# Patient Record
Sex: MALE | Race: WHITE | NOT HISPANIC OR LATINO | Employment: FULL TIME | URBAN - METROPOLITAN AREA
[De-identification: names, ages, dates, MRNs, and addresses within clinical notes are randomized per-mention and may not be internally consistent; named-entity substitution may affect disease eponyms.]

---

## 2017-01-18 ENCOUNTER — OFFICE VISIT (OUTPATIENT)
Dept: GASTROENTEROLOGY | Facility: CLINIC | Age: 57
End: 2017-01-18

## 2017-01-18 VITALS
WEIGHT: 254.6 LBS | SYSTOLIC BLOOD PRESSURE: 132 MMHG | HEIGHT: 71 IN | BODY MASS INDEX: 35.64 KG/M2 | DIASTOLIC BLOOD PRESSURE: 82 MMHG

## 2017-01-18 DIAGNOSIS — K63.5 COLON POLYPS: ICD-10-CM

## 2017-01-18 DIAGNOSIS — K92.1 MELENA: Primary | ICD-10-CM

## 2017-01-18 LAB
ERYTHROCYTE [DISTWIDTH] IN BLOOD BY AUTOMATED COUNT: 14.4 % (ref 11.5–14.5)
HCT VFR BLD AUTO: 32.9 % (ref 40.4–52.2)
HGB BLD-MCNC: 11.1 G/DL (ref 13.7–17.6)
MCH RBC QN AUTO: 28.2 PG (ref 27–32.7)
MCHC RBC AUTO-ENTMCNC: 33.7 G/DL (ref 32.6–36.4)
MCV RBC AUTO: 83.7 FL (ref 79.8–96.2)
PLATELET # BLD AUTO: 282 10*3/MM3 (ref 140–500)
RBC # BLD AUTO: 3.93 10*6/MM3 (ref 4.6–6)
WBC # BLD AUTO: 8.67 10*3/MM3 (ref 4.5–10.7)

## 2017-01-18 PROCEDURE — 99202 OFFICE O/P NEW SF 15 MIN: CPT | Performed by: INTERNAL MEDICINE

## 2017-01-18 RX ORDER — SODIUM CHLORIDE 0.9 % (FLUSH) 0.9 %
1-10 SYRINGE (ML) INJECTION AS NEEDED
Status: CANCELLED | OUTPATIENT
Start: 2017-01-18

## 2017-01-18 RX ORDER — IBUPROFEN 200 MG
200 TABLET ORAL EVERY 6 HOURS PRN
COMMUNITY

## 2017-01-18 RX ORDER — MELATONIN
1000 DAILY
COMMUNITY

## 2017-01-18 RX ORDER — AMLODIPINE AND VALSARTAN 5; 320 MG/1; MG/1
TABLET ORAL
COMMUNITY
Start: 2012-11-28 | End: 2020-01-31

## 2017-01-18 RX ORDER — SODIUM CHLORIDE, SODIUM LACTATE, POTASSIUM CHLORIDE, CALCIUM CHLORIDE 600; 310; 30; 20 MG/100ML; MG/100ML; MG/100ML; MG/100ML
30 INJECTION, SOLUTION INTRAVENOUS CONTINUOUS
Status: CANCELLED | OUTPATIENT
Start: 2017-01-18

## 2017-01-18 NOTE — MR AVS SNAPSHOT
Junior Syed   2017 9:00 AM   Office Visit    Dept Phone:  [phone]   Encounter #:  [id number]    Provider:  Jed STUART MD   Department:  [facility]                Your Full Care Plan              Your Updated Medication List          This list is accurate as of: 17 10:12 AM.  Always use your most recent med list.                amLODIPine-valsartan 5-320 MG per tablet   Commonly known as:  EXFORGE       cholecalciferol 1000 UNITS tablet   Commonly known as:  VITAMIN D3       ibuprofen 200 MG tablet   Commonly known as:  ADVIL,MOTRIN       PEPCID COMPLETE -165 MG chewable tablet   Generic drug:  famotidine-calcium carb-mag hydroxide               You Were Diagnosed With        Codes Comments    Melena    -  Primary ICD-10-CM: K92.1  ICD-9-CM: 578.1       Instructions     None    Patient Instructions History      Upcoming Appointments     Visit Type Date Time Department    NEW PATIENT 2017  9:00 AM [facility] [place]      [employer] Signup     [facility] [employer] allows you to send messages to your doctor, view your test results, renew your prescriptions, schedule appointments, and more. To sign up, go to [employer] and click on the Sign Up Now link in the New User? box. Enter your [employer] Activation Code exactly as it appears below along with the last four digits of your Social Security Number and your Date of Birth () to complete the sign-up process. If you do not sign up before the expiration date, you must request a new code.    [employer] Activation Code: [email or web address]  Expires: 2017 10:12 AM    If you have questions, you can email [email or web address] or call [phone] to talk to our [employer] staff. Remember, [employer] is NOT to be used for urgent needs. For medical emergencies, dial 911.               Other Info from Your Visit           Allergies     No Known Allergies      Reason  "for Visit     Black or Bloody Stool     upset stomach           Vital Signs     Blood Pressure Height Weight Body Mass Index Smoking Status       132/82 71\" (180.3 cm) 254 lb 9.6 oz (115 kg) 35.51 kg/m2 Former Smoker       Problems and Diagnoses Noted     Black stool due to blood    -  Primary        "

## 2017-01-18 NOTE — PROGRESS NOTES
Chief Complaint   Patient presents with   • Black or Bloody Stool   • upset stomach        Junior Syed is a  56 y.o. male here for an initial visit for melena    HPI this 56-year-old white male patient of Lou Gold presents with reported melena over the past weekend.  He describes some periumbilical pain as well as black tarry consistency stools.  His stools have subsequently turned to a dark brown color.  He denies any prior history of melena.  He also denies any use of Pepto-Bismol, iron or other agents that could affect stool color.  He has had previous colonoscopic evaluation although those records are pending.  Per his account he has had polyps removed in the past, it is not clear as to when he is due for follow-up examination at this point.  Normal bowel function is one stool daily to every second or third day but no significant constipation reported.  Family history was negative for colorectal cancer.  He does admit to use of Advil anywhere from one to 4 times per week.  He complains of some recent stressors at home.    Past Medical History   Diagnosis Date   • GERD (gastroesophageal reflux disease)    • Hypertension        Current Outpatient Prescriptions   Medication Sig Dispense Refill   • amLODIPine-valsartan (EXFORGE) 5-320 MG per tablet Take  by mouth.     • cholecalciferol (VITAMIN D3) 1000 UNITS tablet Take 1,000 Units by mouth Daily.     • famotidine-calcium carb-mag hydroxide (PEPCID COMPLETE) -165 MG chewable tablet Chew.     • ibuprofen (ADVIL,MOTRIN) 200 MG tablet Take 200 mg by mouth Every 6 (Six) Hours As Needed for mild pain (1-3).       No current facility-administered medications for this visit.        PRN Meds:.    No Known Allergies    Social History     Social History   • Marital status:      Spouse name: N/A   • Number of children: N/A   • Years of education: N/A     Occupational History   • Not on file.     Social History Main Topics   • Smoking status: Former Smoker      Packs/day: 1.00     Types: Cigarettes     Quit date: 1982   • Smokeless tobacco: Not on file   • Alcohol use No   • Drug use: No   • Sexual activity: Not on file     Other Topics Concern   • Not on file     Social History Narrative   • No narrative on file       History reviewed. No pertinent family history.    Review of Systems   Constitutional: Negative for activity change, appetite change, fatigue and unexpected weight change.   HENT: Negative for congestion, facial swelling, sore throat, trouble swallowing and voice change.    Eyes: Negative for photophobia and visual disturbance.   Respiratory: Negative for cough and choking.    Cardiovascular: Negative for chest pain.   Gastrointestinal: Positive for abdominal pain and blood in stool. Negative for abdominal distention, anal bleeding, constipation, diarrhea, nausea, rectal pain and vomiting.   Endocrine: Negative for polyphagia.   Musculoskeletal: Negative for arthralgias, gait problem and joint swelling.   Skin: Negative for color change, pallor and rash.   Allergic/Immunologic: Negative for food allergies.   Neurological: Negative for speech difficulty and headaches.   Hematological: Does not bruise/bleed easily.   Psychiatric/Behavioral: Negative for agitation, confusion and sleep disturbance.       Vitals:    01/18/17 0924   BP: 132/82       Physical Exam   Constitutional: He is oriented to person, place, and time. He appears well-developed and well-nourished. No distress.   HENT:   Head: Normocephalic.   Mouth/Throat: Oropharynx is clear and moist. No oropharyngeal exudate.   Eyes: Conjunctivae and EOM are normal. No scleral icterus.   Neck: Normal range of motion. No thyromegaly present.   Cardiovascular: Normal rate and regular rhythm.    No murmur heard.  Pulmonary/Chest: Breath sounds normal. No respiratory distress. He has no wheezes. He has no rales.   Abdominal: Soft. Bowel sounds are normal. He exhibits no distension and no mass. There is no  hepatosplenomegaly. There is no tenderness.   Musculoskeletal: Normal range of motion. He exhibits no edema or tenderness.   Lymphadenopathy:     He has no cervical adenopathy.   Neurological: He is alert and oriented to person, place, and time.   Skin: Skin is warm and dry. No rash noted. He is not diaphoretic. No erythema.   Psychiatric: He has a normal mood and affect. His behavior is normal.   Vitals reviewed.      ASSESSMENT   #1 melena: Consistent with upper GI tract bleeding source, no prior history of peptic ulcer disease.  NSAID use reported.  #2 history of polyps: Old records pending        PLAN  Schedule EGD, may warrant colonoscopy as well once records reviewed  CBC  Trial of over-the-counter PPI      ICD-10-CM ICD-9-CM   1. Melena K92.1 578.1

## 2017-01-25 ENCOUNTER — TELEPHONE (OUTPATIENT)
Dept: GASTROENTEROLOGY | Facility: CLINIC | Age: 57
End: 2017-01-25

## 2017-01-25 DIAGNOSIS — Z12.12 SCREENING FOR COLORECTAL CANCER: Primary | ICD-10-CM

## 2017-01-25 DIAGNOSIS — K63.5 COLON POLYPS: ICD-10-CM

## 2017-01-25 DIAGNOSIS — Z12.11 SCREENING FOR COLORECTAL CANCER: Primary | ICD-10-CM

## 2017-01-25 RX ORDER — SODIUM CHLORIDE 0.9 % (FLUSH) 0.9 %
1-10 SYRINGE (ML) INJECTION AS NEEDED
Status: CANCELLED | OUTPATIENT
Start: 2017-01-25

## 2017-01-25 NOTE — TELEPHONE ENCOUNTER
Called pt and on vm advised per Dr Zhang that it had been since 2002 since his last c/s and he certainly qualifies for c/s for screening purposes.  Message sent to scheduling to add c/s.

## 2017-01-25 NOTE — TELEPHONE ENCOUNTER
Returned pt's call and advised per Dr Zhang that his hgb is low at 11.1 and rbc low at 3.93 and he recommends to proceed with egd as planned.       Pt verb understanding and is asking if Dr Zhang has reviewed his other records.  He states if he needs c/s he would like to have it done at the same time as egd.  Advised would send message to Dr Zhang.  Pt verb understanding.

## 2017-01-25 NOTE — TELEPHONE ENCOUNTER
----- Message from Jed STUART MD sent at 1/19/2017  6:05 PM EST -----  Regarding: Results  Would notify patient of lab results, proceed with EGD as planned  ----- Message -----     From: [facility] Results In     Sent: 1/18/2017   4:18 PM       To: Jed STUART MD

## 2017-01-25 NOTE — TELEPHONE ENCOUNTER
According to old record review, last colonoscopy was in 2002.  He was certainly qualify for colonoscopy at this time for screening purposes alone.  Can add this to his EGD if patient wishes.  We will send in case request.

## 2017-01-30 ENCOUNTER — OUTSIDE FACILITY SERVICE (OUTPATIENT)
Dept: GASTROENTEROLOGY | Facility: CLINIC | Age: 57
End: 2017-01-30

## 2017-02-03 ENCOUNTER — OFFICE VISIT (OUTPATIENT)
Dept: RETAIL CLINIC | Facility: CLINIC | Age: 57
End: 2017-02-03

## 2017-02-03 VITALS
RESPIRATION RATE: 22 BRPM | OXYGEN SATURATION: 97 % | TEMPERATURE: 100.7 F | DIASTOLIC BLOOD PRESSURE: 78 MMHG | SYSTOLIC BLOOD PRESSURE: 110 MMHG | HEART RATE: 91 BPM

## 2017-02-03 DIAGNOSIS — J02.9 PHARYNGITIS, UNSPECIFIED ETIOLOGY: ICD-10-CM

## 2017-02-03 DIAGNOSIS — J06.9 ACUTE URI: Primary | ICD-10-CM

## 2017-02-03 PROCEDURE — 99213 OFFICE O/P EST LOW 20 MIN: CPT | Performed by: NURSE PRACTITIONER

## 2017-02-03 RX ORDER — FLUTICASONE PROPIONATE 50 MCG
2 SPRAY, SUSPENSION (ML) NASAL DAILY
Qty: 1 EACH | Refills: 0 | Status: SHIPPED | OUTPATIENT
Start: 2017-02-03 | End: 2017-02-07

## 2017-02-03 RX ORDER — CETIRIZINE HYDROCHLORIDE 10 MG/1
10 TABLET ORAL DAILY
Qty: 14 TABLET | Refills: 0 | Status: SHIPPED | OUTPATIENT
Start: 2017-02-03 | End: 2017-02-17

## 2017-02-03 NOTE — PATIENT INSTRUCTIONS
"Upper Respiratory Infection, Adult  Most upper respiratory infections (URIs) are a viral infection of the air passages leading to the lungs. A URI affects the nose, throat, and upper air passages. The most common type of URI is nasopharyngitis and is typically referred to as \"the common cold.\"  URIs run their course and usually go away on their own. Most of the time, a URI does not require medical attention, but sometimes a bacterial infection in the upper airways can follow a viral infection. This is called a secondary infection. Sinus and middle ear infections are common types of secondary upper respiratory infections.  Bacterial pneumonia can also complicate a URI. A URI can worsen asthma and chronic obstructive pulmonary disease (COPD). Sometimes, these complications can require emergency medical care and may be life threatening.   CAUSES  Almost all URIs are caused by viruses. A virus is a type of germ and can spread from one person to another.   RISKS FACTORS  You may be at risk for a URI if:   · You smoke.    · You have chronic heart or lung disease.  · You have a weakened defense (immune) system.    · You are very young or very old.    · You have nasal allergies or asthma.  · You work in crowded or poorly ventilated areas.  · You work in health care facilities or schools.  SIGNS AND SYMPTOMS   Symptoms typically develop 2-3 days after you come in contact with a cold virus. Most viral URIs last 7-10 days. However, viral URIs from the influenza virus (flu virus) can last 14-18 days and are typically more severe. Symptoms may include:   · Runny or stuffy (congested) nose.    · Sneezing.    · Cough.    · Sore throat.    · Headache.    · Fatigue.    · Fever.    · Loss of appetite.    · Pain in your forehead, behind your eyes, and over your cheekbones (sinus pain).  · Muscle aches.    DIAGNOSIS   Your health care provider may diagnose a URI by:  · Physical exam.  · Tests to check that your symptoms are not due to " another condition such as:  ¨ Strep throat.  ¨ Sinusitis.  ¨ Pneumonia.  ¨ Asthma.  TREATMENT   A URI goes away on its own with time. It cannot be cured with medicines, but medicines may be prescribed or recommended to relieve symptoms. Medicines may help:  · Reduce your fever.  · Reduce your cough.  · Relieve nasal congestion.  HOME CARE INSTRUCTIONS   · Take medicines only as directed by your health care provider.    · Gargle warm saltwater or take cough drops to comfort your throat as directed by your health care provider.  · Use a warm mist humidifier or inhale steam from a shower to increase air moisture. This may make it easier to breathe.  · Drink enough fluid to keep your urine clear or pale yellow.    · Eat soups and other clear broths and maintain good nutrition.    · Rest as needed.    · Return to work when your temperature has returned to normal or as your health care provider advises. You may need to stay home longer to avoid infecting others. You can also use a face mask and careful hand washing to prevent spread of the virus.  · Increase the usage of your inhaler if you have asthma.    · Do not use any tobacco products, including cigarettes, chewing tobacco, or electronic cigarettes. If you need help quitting, ask your health care provider.  PREVENTION   The best way to protect yourself from getting a cold is to practice good hygiene.   · Avoid oral or hand contact with people with cold symptoms.    · Wash your hands often if contact occurs.    There is no clear evidence that vitamin C, vitamin E, echinacea, or exercise reduces the chance of developing a cold. However, it is always recommended to get plenty of rest, exercise, and practice good nutrition.   SEEK MEDICAL CARE IF:   · You are getting worse rather than better.    · Your symptoms are not controlled by medicine.    · You have chills.  · You have worsening shortness of breath.  · You have brown or red mucus.  · You have yellow or brown nasal  discharge.  · You have pain in your face, especially when you bend forward.  · You have a fever.  · You have swollen neck glands.  · You have pain while swallowing.  · You have white areas in the back of your throat.  SEEK IMMEDIATE MEDICAL CARE IF:   · You have severe or persistent:    Headache.    Ear pain.    Sinus pain.    Chest pain.  · You have chronic lung disease and any of the following:    Wheezing.    Prolonged cough.    Coughing up blood.    A change in your usual mucus.  · You have a stiff neck.  · You have changes in your:    Vision.    Hearing.    Thinking.    Mood.  MAKE SURE YOU:   · Understand these instructions.  · Will watch your condition.  · Will get help right away if you are not doing well or get worse.     This information is not intended to replace advice given to you by your health care provider. Make sure you discuss any questions you have with your health care provider.     Document Released: 06/13/2002 Document Revised: 05/03/2016 Document Reviewed: 03/25/2015  [employer] Interactive Patient Education ©2016 [employer] Inc[employer]  Pharyngitis  Pharyngitis is redness, pain, and swelling (inflammation) of your pharynx.   CAUSES   Pharyngitis is usually caused by infection. Most of the time, these infections are from viruses (viral) and are part of a cold. However, sometimes pharyngitis is caused by bacteria (bacterial). Pharyngitis can also be caused by allergies. Viral pharyngitis may be spread from person to person by coughing, sneezing, and personal items or utensils (cups, forks, spoons, toothbrushes). Bacterial pharyngitis may be spread from person to person by more intimate contact, such as kissing.   SIGNS AND SYMPTOMS   Symptoms of pharyngitis include:    · Sore throat.    · Tiredness (fatigue).    · Low-grade fever.    · Headache.  · Joint pain and muscle aches.  · Skin rashes.  · Swollen lymph nodes.  · Plaque-like film on throat or tonsils (often seen with bacterial  pharyngitis).  DIAGNOSIS   Your health care provider will ask you questions about your illness and your symptoms. Your medical history, along with a physical exam, is often all that is needed to diagnose pharyngitis. Sometimes, a rapid strep test is done. Other lab tests may also be done, depending on the suspected cause.   TREATMENT   Viral pharyngitis will usually get better in 3-4 days without the use of medicine. Bacterial pharyngitis is treated with medicines that kill germs (antibiotics).   HOME CARE INSTRUCTIONS   · Drink enough water and fluids to keep your urine clear or pale yellow.    · Only take over-the-counter or prescription medicines as directed by your health care provider:      If you are prescribed antibiotics, make sure you finish them even if you start to feel better.      Do not take aspirin.    · Get lots of rest.    · Gargle with 8 oz of salt water (½ tsp of salt per 1 qt of water) as often as every 1-2 hours to soothe your throat.    · Throat lozenges (if you are not at risk for choking) or sprays may be used to soothe your throat.  SEEK MEDICAL CARE IF:   · You have large, tender lumps in your neck.  · You have a rash.  · You cough up green, yellow-brown, or bloody spit.  SEEK IMMEDIATE MEDICAL CARE IF:   · Your neck becomes stiff.  · You drool or are unable to swallow liquids.  · You vomit or are unable to keep medicines or liquids down.  · You have severe pain that does not go away with the use of recommended medicines.  · You have trouble breathing (not caused by a stuffy nose).  MAKE SURE YOU:   · Understand these instructions.  · Will watch your condition.  · Will get help right away if you are not doing well or get worse.     This information is not intended to replace advice given to you by your health care provider. Make sure you discuss any questions you have with your health care provider.     Document Released: 12/18/2006 Document Revised: 10/08/2014 Document Reviewed:  08/25/2014  Elsevier Interactive Patient Education ©2016 Elsevier Inc.

## 2017-02-03 NOTE — PROGRESS NOTES
Subjective   Patient ID: Junior Syed is a 56 y.o. male presents with   Chief Complaint   Patient presents with   • Sore Throat     since last pm   • Fever   • Nasal Congestion   • Generalized Body Aches       Sore Throat    This is a new problem. The current episode started yesterday. The maximum temperature recorded prior to his arrival was 100.4 - 100.9 F. The fever has been present for less than 1 day. The pain is mild. Associated symptoms include congestion, coughing (dry x 1 day) and headaches. Pertinent negatives include no diarrhea, ear discharge, ear pain, shortness of breath, trouble swallowing or vomiting. He has tried acetaminophen (cough drops, robitussin) for the symptoms. The treatment provided mild relief.       No Known Allergies    The following portions of the patient's history were reviewed and updated as appropriate: allergies, current medications, past family history, past medical history, past social history, past surgical history and problem list.      Review of Systems   Constitutional: Positive for chills, fatigue and fever. Negative for diaphoresis.   HENT: Positive for congestion, postnasal drip, rhinorrhea, sinus pressure, sneezing and sore throat. Negative for ear discharge, ear pain, hearing loss and trouble swallowing.    Respiratory: Positive for cough (dry x 1 day). Negative for chest tightness, shortness of breath and wheezing.    Gastrointestinal: Negative for diarrhea, nausea and vomiting.   Musculoskeletal: Positive for myalgias.   Allergic/Immunologic: Positive for environmental allergies.   Neurological: Positive for headaches.       Objective     Vitals:    02/03/17 0951   BP: 110/78   Pulse: 91   Resp: 22   Temp: (!) 100.7 °F (38.2 °C)   SpO2: 97%         Physical Exam   Constitutional: He is oriented to person, place, and time. He appears well-developed and well-nourished. He does not appear ill. No distress.   HENT:   Head: Normocephalic.   Right Ear: Hearing, external  ear and ear canal normal.   Left Ear: Hearing, external ear and ear canal normal.   Nose: Sinus tenderness present. No rhinorrhea. Right sinus exhibits frontal sinus tenderness. Left sinus exhibits frontal sinus tenderness.   Mouth/Throat: Mucous membranes are normal. Posterior oropharyngeal erythema (mild) present. Tonsils are 2+ on the right. Tonsils are 2+ on the left. No tonsillar exudate.   Bilateral TMs not visualized due to cerumen impaction.     Eyes: Conjunctivae are normal.   Sclera white.   Neck: No tracheal deviation present.   Cardiovascular: Normal rate, regular rhythm, S1 normal, S2 normal and normal heart sounds.    Pulmonary/Chest: Effort normal and breath sounds normal. No accessory muscle usage. No respiratory distress.   Abdominal: Soft. Bowel sounds are normal. There is no tenderness.   Lymphadenopathy:     He has no cervical adenopathy.   Neurological: He is alert and oriented to person, place, and time.   Skin: Skin is warm and dry.   Vitals reviewed.         Junior was seen today for sore throat, fever, nasal congestion and generalized body aches.    Diagnoses and all orders for this visit:    Acute URI  -     fluticasone (FLONASE) 50 MCG/ACT nasal spray; 2 sprays into each nostril Daily for 4 days.  -     cetirizine (zyrTEC) 10 MG tablet; Take 1 tablet by mouth Daily for 14 days.    Pharyngitis, unspecified etiology        Follow-up with Primary Care Physician in 48-72 hours if these symptoms worsen or fail to improve as anticipated. Patient verbalizes understanding.

## 2017-02-07 ENCOUNTER — TELEPHONE (OUTPATIENT)
Dept: GASTROENTEROLOGY | Facility: CLINIC | Age: 57
End: 2017-02-07

## 2017-02-07 NOTE — TELEPHONE ENCOUNTER
----- Message from Jed STUART MD sent at 2/6/2017  5:53 PM EST -----  Regarding: RE: Path / Procedure  Okay to call results, recommend treatment with Pylera or Prevpac and subsequently with proton pump inhibitor once daily.  ----- Message -----     From: Carlos Cohn     Sent: 2/6/2017   2:04 PM       To: Jed STUART MD  Subject: Path / Procedure                                 Scanned in

## 2017-02-07 NOTE — TELEPHONE ENCOUNTER
With prior history of polyps and current history of hyperplastic polyp would advise follow-up colonoscopy in 5 years

## 2017-02-07 NOTE — TELEPHONE ENCOUNTER
Pt called and advsied per Dr Zhang that his duodenum bx showed active peptic type duodenitis with h plyori.  The stomach bx showed chronic active gastritis h pylori.  The ge junction bx showed mild active inflammation with no lucero's.  Also advised pt had one benign colon polyp     Confirmed with pt that he has no allergies to any medications.  Advised we would call in pylera pack for 14days and advised the pt we would call in omeprazole 20mg po bid for 14days .  Advised the pt when he completes the meds to let us know and we will call in a ppi daily for him to take.  Pt verb understanding.     Message sent to Dr Zhang for when to have repeat c/s.

## 2017-02-08 ENCOUNTER — TELEPHONE (OUTPATIENT)
Dept: GASTROENTEROLOGY | Facility: CLINIC | Age: 57
End: 2017-02-08

## 2017-02-08 NOTE — TELEPHONE ENCOUNTER
----- Message from Aashish Zepeda sent at 2/7/2017  4:13 PM EST -----  Regarding: CALL BACK   Contact: [phone]  PT CALLED BACK ABOUT MEDICATION

## 2017-02-08 NOTE — TELEPHONE ENCOUNTER
Called pt and pt reports his insurance will not cover for the pylera pack.  Advised would call his pharmacy and call him back.   Called pt's [facility] pharmacy and spoke with Luis and called in biaxin 500mg  One po bid for 14 days and amoxicillin 500mg take 2 tabs po bid for 14 days.  Pharmacist advised pt's insurance will not cover for bid ppi. Called pt and on vm  advised of the above and advised pt to purchase omeprazole 20mg otc and take it twice a day for 14 days.  Advised him to call with any questions.

## 2017-02-08 NOTE — TELEPHONE ENCOUNTER
Called pt and advised per  Dr Zhang with prior hx of polyps and current hx of hyperplastic polyp would advised repeat c/s in 5 yrs.  Pt verb understanding.     Message sent to Lexi to place c/s in recall for 01/31/2022.

## 2017-02-16 ENCOUNTER — DOCUMENTATION (OUTPATIENT)
Dept: GASTROENTEROLOGY | Facility: CLINIC | Age: 57
End: 2017-02-16

## 2017-02-16 NOTE — PROGRESS NOTES
Solis CASTILLO for Pylera-medication was changed to preferred med..   Note telephone encounter dated 2/8/17-I did confirm w/pharmacist Luis Jeffery today 2/16/2017 that new medication was picked up by patient.

## 2017-02-22 ENCOUNTER — TELEPHONE (OUTPATIENT)
Dept: GASTROENTEROLOGY | Facility: CLINIC | Age: 57
End: 2017-02-22

## 2017-02-22 NOTE — TELEPHONE ENCOUNTER
Once he has completed antibiotics for treatment of H. pylori, he should take his PPI daily for a period of 6-8 weeks.  Then he can stop the PPI (or switch to a histamine blocker if symptomatic when he stops ) and after 4 weeks undergo a urease breath test to confirm that H. pylori is eradicated

## 2017-02-22 NOTE — TELEPHONE ENCOUNTER
----- Message from Roma Guzman sent at 2/22/2017 11:06 AM EST -----  Regarding: PT CALLED - UPDATE  Contact: [phone]  PT WAS ADVISED TO CALL AND LET YOU ALL KNOW WHEN HE FINISHED 2WKS OF ANTIBIOTIC. HE IS FINISHED. THANKS

## 2017-02-24 NOTE — TELEPHONE ENCOUNTER
Call from pt.  Advise per Dr Zhang that once he has completed abx for treatment of H pylori, he should take his PPI daily for a period of 6-8 weeks.  Then he can stop the PPI, (or switch to a histamine blocker if symptomatic when he stops) and after 4 weeks undergo a urease breath test to confirmed that H pylori is eradicated.    This info repeated several times.  Advise pt that histamine blocker is Zantac or Tagamet.  Pt verb understanding.

## 2017-08-18 ENCOUNTER — OFFICE VISIT (OUTPATIENT)
Dept: RETAIL CLINIC | Facility: CLINIC | Age: 57
End: 2017-08-18

## 2017-08-18 VITALS
SYSTOLIC BLOOD PRESSURE: 110 MMHG | TEMPERATURE: 97.8 F | DIASTOLIC BLOOD PRESSURE: 78 MMHG | OXYGEN SATURATION: 97 % | RESPIRATION RATE: 16 BRPM | HEART RATE: 72 BPM

## 2017-08-18 DIAGNOSIS — J06.9 ACUTE URI: Primary | ICD-10-CM

## 2017-08-18 PROCEDURE — 99213 OFFICE O/P EST LOW 20 MIN: CPT | Performed by: NURSE PRACTITIONER

## 2017-08-18 RX ORDER — RANITIDINE 150 MG/1
150 TABLET ORAL NIGHTLY
COMMUNITY
End: 2020-01-31 | Stop reason: RX

## 2017-08-18 NOTE — PATIENT INSTRUCTIONS
"Upper Respiratory Infection, Adult  Most upper respiratory infections (URIs) are a viral infection of the air passages leading to the lungs. A URI affects the nose, throat, and upper air passages. The most common type of URI is nasopharyngitis and is typically referred to as \"the common cold.\"  URIs run their course and usually go away on their own. Most of the time, a URI does not require medical attention, but sometimes a bacterial infection in the upper airways can follow a viral infection. This is called a secondary infection. Sinus and middle ear infections are common types of secondary upper respiratory infections.  Bacterial pneumonia can also complicate a URI. A URI can worsen asthma and chronic obstructive pulmonary disease (COPD). Sometimes, these complications can require emergency medical care and may be life threatening.   CAUSES  Almost all URIs are caused by viruses. A virus is a type of germ and can spread from one person to another.   RISKS FACTORS  You may be at risk for a URI if:   · You smoke.    · You have chronic heart or lung disease.  · You have a weakened defense (immune) system.    · You are very young or very old.    · You have nasal allergies or asthma.  · You work in crowded or poorly ventilated areas.  · You work in health care facilities or schools.  SIGNS AND SYMPTOMS   Symptoms typically develop 2-3 days after you come in contact with a cold virus. Most viral URIs last 7-10 days. However, viral URIs from the influenza virus (flu virus) can last 14-18 days and are typically more severe. Symptoms may include:   · Runny or stuffy (congested) nose.    · Sneezing.    · Cough.    · Sore throat.    · Headache.    · Fatigue.    · Fever.    · Loss of appetite.    · Pain in your forehead, behind your eyes, and over your cheekbones (sinus pain).  · Muscle aches.    DIAGNOSIS   Your health care provider may diagnose a URI by:  · Physical exam.  · Tests to check that your symptoms are not due to " another condition such as:  ¨ Strep throat.  ¨ Sinusitis.  ¨ Pneumonia.  ¨ Asthma.  TREATMENT   A URI goes away on its own with time. It cannot be cured with medicines, but medicines may be prescribed or recommended to relieve symptoms. Medicines may help:  · Reduce your fever.  · Reduce your cough.  · Relieve nasal congestion.  HOME CARE INSTRUCTIONS   · Take medicines only as directed by your health care provider.    · Gargle warm saltwater or take cough drops to comfort your throat as directed by your health care provider.  · Use a warm mist humidifier or inhale steam from a shower to increase air moisture. This may make it easier to breathe.  · Drink enough fluid to keep your urine clear or pale yellow.    · Eat soups and other clear broths and maintain good nutrition.    · Rest as needed.    · Return to work when your temperature has returned to normal or as your health care provider advises. You may need to stay home longer to avoid infecting others. You can also use a face mask and careful hand washing to prevent spread of the virus.  · Increase the usage of your inhaler if you have asthma.    · Do not use any tobacco products, including cigarettes, chewing tobacco, or electronic cigarettes. If you need help quitting, ask your health care provider.  PREVENTION   The best way to protect yourself from getting a cold is to practice good hygiene.   · Avoid oral or hand contact with people with cold symptoms.    · Wash your hands often if contact occurs.    There is no clear evidence that vitamin C, vitamin E, echinacea, or exercise reduces the chance of developing a cold. However, it is always recommended to get plenty of rest, exercise, and practice good nutrition.   SEEK MEDICAL CARE IF:   · You are getting worse rather than better.    · Your symptoms are not controlled by medicine.    · You have chills.  · You have worsening shortness of breath.  · You have brown or red mucus.  · You have yellow or brown nasal  discharge.  · You have pain in your face, especially when you bend forward.  · You have a fever.  · You have swollen neck glands.  · You have pain while swallowing.  · You have white areas in the back of your throat.  SEEK IMMEDIATE MEDICAL CARE IF:   · You have severe or persistent:    Headache.    Ear pain.    Sinus pain.    Chest pain.  · You have chronic lung disease and any of the following:    Wheezing.    Prolonged cough.    Coughing up blood.    A change in your usual mucus.  · You have a stiff neck.  · You have changes in your:    Vision.    Hearing.    Thinking.    Mood.  MAKE SURE YOU:   · Understand these instructions.  · Will watch your condition.  · Will get help right away if you are not doing well or get worse.     This information is not intended to replace advice given to you by your health care provider. Make sure you discuss any questions you have with your health care provider.     Document Released: 06/13/2002 Document Revised: 05/03/2016 Document Reviewed: 03/25/2015  [employer] Interactive Patient Education ©2017 [employer]

## 2017-08-18 NOTE — PROGRESS NOTES
Subjective   Patient ID: Junior Syed is a 56 y.o. male presents with   Chief Complaint   Patient presents with   • URI     x2 days   • Cough   • Sore Throat     drainage       URI    This is a new problem. The current episode started in the past 7 days. The problem has been unchanged. Maximum temperature: low grade. Associated symptoms include congestion, coughing (dry), rhinorrhea, sneezing and a sore throat. Pertinent negatives include no wheezing. He has tried acetaminophen for the symptoms. The treatment provided mild relief.       No Known Allergies    The following portions of the patient's history were reviewed and updated as appropriate: allergies, current medications, past family history, past medical history, past social history, past surgical history and problem list.      Review of Systems   Constitutional: Positive for chills, diaphoresis, fatigue and fever (low grade).   HENT: Positive for congestion, postnasal drip, rhinorrhea, sinus pressure, sneezing and sore throat. Negative for trouble swallowing.    Respiratory: Positive for cough (dry). Negative for chest tightness, shortness of breath and wheezing.    Cardiovascular: Negative.    Gastrointestinal: Negative.    Allergic/Immunologic: Positive for environmental allergies.       Objective     Vitals:    08/18/17 0908   BP: 110/78   Pulse: 72   Resp: 16   Temp: 97.8 °F (36.6 °C)   SpO2: 97%         Physical Exam   Constitutional: He is oriented to person, place, and time. He appears well-developed and well-nourished. He does not appear ill. No distress.   HENT:   Head: Normocephalic.   Right Ear: Hearing, tympanic membrane, external ear and ear canal normal.   Left Ear: Hearing, tympanic membrane, external ear and ear canal normal.   Nose: Mucosal edema, rhinorrhea and sinus tenderness present. Right sinus exhibits maxillary sinus tenderness. Left sinus exhibits maxillary sinus tenderness.   Mouth/Throat: Oropharynx is clear and moist and mucous  membranes are normal. Tonsils are 2+ on the right. Tonsils are 2+ on the left. No tonsillar exudate.   Eyes: Conjunctivae are normal.   Sclera white.   Neck: No tracheal deviation present.   Cardiovascular: Normal rate, regular rhythm, S1 normal, S2 normal and normal heart sounds.    Pulmonary/Chest: Effort normal and breath sounds normal. No accessory muscle usage. No respiratory distress.   Abdominal: Soft. Bowel sounds are normal. There is no tenderness.   Lymphadenopathy:     He has no cervical adenopathy.   Neurological: He is alert and oriented to person, place, and time.   Skin: Skin is warm and dry.   Vitals reviewed.        Junior was seen today for uri, cough and sore throat.    Diagnoses and all orders for this visit:    Acute URI        Patient will take Mucinex 1 tab po bid for 5 days. May take Coricidin HBP po as directed per bottle for 5 days. Drink at least 8 cups of water a day. Throat lozenges/sprays, warm/cold drinks, cough drops, warm salt gargle to soothe throat.  Follow-up with Primary Care Physician in 48-72 hours if these symptoms worsen or fail to improve as anticipated. Patient verbalizes understanding.

## 2019-04-14 ENCOUNTER — OFFICE VISIT (OUTPATIENT)
Dept: RETAIL CLINIC | Facility: CLINIC | Age: 59
End: 2019-04-14

## 2019-04-14 VITALS
DIASTOLIC BLOOD PRESSURE: 88 MMHG | TEMPERATURE: 98.7 F | RESPIRATION RATE: 18 BRPM | OXYGEN SATURATION: 97 % | HEART RATE: 69 BPM | SYSTOLIC BLOOD PRESSURE: 142 MMHG

## 2019-04-14 DIAGNOSIS — T78.3XXA ANGIOEDEMA OF LIPS, INITIAL ENCOUNTER: Primary | ICD-10-CM

## 2019-04-14 PROCEDURE — 96372 THER/PROPH/DIAG INJ SC/IM: CPT | Performed by: NURSE PRACTITIONER

## 2019-04-14 PROCEDURE — 99213 OFFICE O/P EST LOW 20 MIN: CPT | Performed by: NURSE PRACTITIONER

## 2019-04-14 RX ORDER — METHYLPREDNISOLONE ACETATE 80 MG/ML
60 INJECTION, SUSPENSION INTRA-ARTICULAR; INTRALESIONAL; INTRAMUSCULAR; SOFT TISSUE ONCE
Status: COMPLETED | OUTPATIENT
Start: 2019-04-14 | End: 2019-04-14

## 2019-04-14 RX ADMIN — METHYLPREDNISOLONE ACETATE 60 MG: 80 INJECTION, SUSPENSION INTRA-ARTICULAR; INTRALESIONAL; INTRAMUSCULAR; SOFT TISSUE at 12:56

## 2019-04-14 NOTE — PROGRESS NOTES
Subjective   Patient ID: Junior Syed is a 58 y.o. male presents with   Chief Complaint   Patient presents with   • Allergic Reaction       Allergic Reaction   This is a new problem. The current episode started yesterday. The problem has been gradually improving since onset. The problem is mild. The patient was exposed to shellfish. Time of exposure: 9:30pm ate shrimp, swelling of mouth started at about 12:30am. The exposure occurred at home. Pertinent negatives include no drooling. Swelling is present on the lips. Past treatments include diphenhydramine. The treatment provided moderate relief. His past medical history is significant for seasonal allergies. There is no history of asthma, atopic dermatitis, food allergies (possibly shellfish) or medication allergies.       Allergies   Allergen Reactions   • Shellfish-Derived Products Swelling       The following portions of the patient's history were reviewed and updated as appropriate: allergies, current medications, past family history, past medical history, past social history, past surgical history and problem list.      Review of Systems   Constitutional: Positive for fatigue. Negative for chills, diaphoresis and fever.   HENT: Positive for dental problem (4/2/19 removed 7 of the upper front  teeth) and facial swelling (mild lips). Negative for drooling.    Respiratory: Negative.    Cardiovascular: Negative.    Gastrointestinal: Negative.    Allergic/Immunologic: Negative for food allergies (possibly shellfish).       Objective     Vitals:    04/14/19 1250   BP: 142/88   Pulse: 69   Resp: 18   Temp: 98.7 °F (37.1 °C)   SpO2: 97%         Physical Exam   Constitutional: He is oriented to person, place, and time. He appears well-developed and well-nourished. He does not appear ill. No distress.   HENT:   Head: Normocephalic.   Right Ear: Hearing and external ear normal.   Left Ear: Hearing and external ear normal.   Mouth/Throat: Mucous membranes are normal.  Abnormal dentition (absent upper front teeth.). No posterior oropharyngeal edema.   Mild edema to upper lip.     Eyes: Conjunctivae are normal.   Sclera white.   Neck: No tracheal deviation present.   Cardiovascular: Normal rate, regular rhythm, S1 normal, S2 normal and normal heart sounds.   Pulmonary/Chest: Effort normal and breath sounds normal. No accessory muscle usage. No respiratory distress.   Abdominal: Soft. Bowel sounds are normal. There is no tenderness.   Lymphadenopathy:     He has no cervical adenopathy.   Neurological: He is alert and oriented to person, place, and time.   Skin: Skin is warm and dry.   Vitals reviewed.        Junior was seen today for allergic reaction.    Diagnoses and all orders for this visit:    Angioedema of lips, initial encounter  -     methylPREDNISolone acetate (DEPO-medrol) injection 60 mg        Patient understands possible side effects of all medications ordered. Follow-up with Primary Care Physician in 48-72 hours if these symptoms worsen or fail to improve as anticipated. Patient verbalizes understanding.    Angioedema  Angioedema is the sudden swelling of tissue in the body. Angioedema can affect any part of the body, but it most often affects the deeper parts of the skin, causing red, itchy patches (hives) to appear over the affected area. It often begins during the night and is found in the morning.  Depending on the cause, angioedema may happen:  · Only once.  · Several times. It may come back in unpredictable patterns.  · Repeatedly for several years. Over time, it may gradually stop coming back.    Angioedema can be life-threatening if it affects the air passages that you breathe through.  What are the causes?  This condition may be caused by:  · Foods, such as milk, eggs, shellfish, wheat, or nuts.  · Certain medicines, such as ACE inhibitors, antibiotics, nonsteroidal anti-inflammatory drugs, birth control pills, or dyes used in X-rays.  · Insect  stings.  · Infections.    Angioedema can be inherited, and episodes can be triggered by:  · Mild injury.  · Dental work.  · Surgery.  · Stress.  · Sudden changes in temperature.  · Exercise.    In some cases, the cause of this condition is not known.  What are the signs or symptoms?  Symptoms of this condition depend on where the swelling happens. Symptoms may include:  · Swollen skin.  · Red, itchy patches of skin (hives).  · Redness in the affected area.  · Pain in the affected area.  · Swollen lips or tongue.  · Wheezing.  · Breathing problems.    If your internal organs are involved, symptoms may also include:  · Nausea.  · Abdominal pain.  · Vomiting.  · Difficulty swallowing.  · Difficulty passing urine.    How is this diagnosed?  This condition may be diagnosed based on:  · An exam of the affected area.  · Your medical history.  · Whether anyone in your family has had this condition before.  · A review of any medicines you have been taking.  · Tests, including:  ? Allergy skin tests to see if the condition was caused by an allergic reaction.  ? Blood tests to see if the condition was caused by a gene.  ? Tests to check for underlying diseases that could cause the condition.    How is this treated?  Treatment for this condition depends on the cause. It may involve any of the following:  · If something triggered the condition, making changes to keep it from triggering the condition again.  · If the condition affects your breathing, having tubes placed in your airway to keep it open.  · Taking medicines to treat symptoms or prevent future episodes. These may include:  ? Antihistamines.  ? Epinephrine injections.  ? Steroids.    If your condition is severe, you may need to be treated at the hospital. Angioedema usually gets better in 24-48 hours.  Follow these instructions at home:  · Take over-the-counter and prescription medicines only as told by your health care provider.  · If you were given medicines for  emergency allergy treatment, always carry them with you.  · Wear a medical bracelet as told by your health care provider.  · If something triggers your condition, avoid the trigger, if possible.  · If your condition is inherited and you are thinking about having children, talk to your health care provider. It is important to discuss the risks of passing on the condition to your children.  Contact a health care provider if:  · You have repeated episodes of angioedema.  · Episodes of angioedema start to happen more often than they used to, even after you take steps to prevent them.  · You have episodes of angioedema that are more severe than they have been before, even after you take steps to prevent them.  · You are thinking about having children.  Get help right away if:  · You have severe swelling of your mouth, tongue, or lips.  · You have trouble breathing.  · You have trouble swallowing.  · You faint.  This information is not intended to replace advice given to you by your health care provider. Make sure you discuss any questions you have with your health care provider.  Document Released: 02/26/2003 Document Revised: 07/15/2017 Document Reviewed: 06/27/2017  [employer] Interactive Patient Education © 2019 [employer]

## 2019-04-14 NOTE — PATIENT INSTRUCTIONS
Angioedema  Angioedema is the sudden swelling of tissue in the body. Angioedema can affect any part of the body, but it most often affects the deeper parts of the skin, causing red, itchy patches (hives) to appear over the affected area. It often begins during the night and is found in the morning.  Depending on the cause, angioedema may happen:  · Only once.  · Several times. It may come back in unpredictable patterns.  · Repeatedly for several years. Over time, it may gradually stop coming back.    Angioedema can be life-threatening if it affects the air passages that you breathe through.  What are the causes?  This condition may be caused by:  · Foods, such as milk, eggs, shellfish, wheat, or nuts.  · Certain medicines, such as ACE inhibitors, antibiotics, nonsteroidal anti-inflammatory drugs, birth control pills, or dyes used in X-rays.  · Insect stings.  · Infections.    Angioedema can be inherited, and episodes can be triggered by:  · Mild injury.  · Dental work.  · Surgery.  · Stress.  · Sudden changes in temperature.  · Exercise.    In some cases, the cause of this condition is not known.  What are the signs or symptoms?  Symptoms of this condition depend on where the swelling happens. Symptoms may include:  · Swollen skin.  · Red, itchy patches of skin (hives).  · Redness in the affected area.  · Pain in the affected area.  · Swollen lips or tongue.  · Wheezing.  · Breathing problems.    If your internal organs are involved, symptoms may also include:  · Nausea.  · Abdominal pain.  · Vomiting.  · Difficulty swallowing.  · Difficulty passing urine.    How is this diagnosed?  This condition may be diagnosed based on:  · An exam of the affected area.  · Your medical history.  · Whether anyone in your family has had this condition before.  · A review of any medicines you have been taking.  · Tests, including:  ? Allergy skin tests to see if the condition was caused by an allergic reaction.  ? Blood tests to see  if the condition was caused by a gene.  ? Tests to check for underlying diseases that could cause the condition.    How is this treated?  Treatment for this condition depends on the cause. It may involve any of the following:  · If something triggered the condition, making changes to keep it from triggering the condition again.  · If the condition affects your breathing, having tubes placed in your airway to keep it open.  · Taking medicines to treat symptoms or prevent future episodes. These may include:  ? Antihistamines.  ? Epinephrine injections.  ? Steroids.    If your condition is severe, you may need to be treated at the hospital. Angioedema usually gets better in 24-48 hours.  Follow these instructions at home:  · Take over-the-counter and prescription medicines only as told by your health care provider.  · If you were given medicines for emergency allergy treatment, always carry them with you.  · Wear a medical bracelet as told by your health care provider.  · If something triggers your condition, avoid the trigger, if possible.  · If your condition is inherited and you are thinking about having children, talk to your health care provider. It is important to discuss the risks of passing on the condition to your children.  Contact a health care provider if:  · You have repeated episodes of angioedema.  · Episodes of angioedema start to happen more often than they used to, even after you take steps to prevent them.  · You have episodes of angioedema that are more severe than they have been before, even after you take steps to prevent them.  · You are thinking about having children.  Get help right away if:  · You have severe swelling of your mouth, tongue, or lips.  · You have trouble breathing.  · You have trouble swallowing.  · You faint.  This information is not intended to replace advice given to you by your health care provider. Make sure you discuss any questions you have with your health care  provider.  Document Released: 02/26/2003 Document Revised: 07/15/2017 Document Reviewed: 06/27/2017  [employer] Interactive Patient Education © 2019 [employer]

## 2019-10-23 ENCOUNTER — IMMUNIZATION (OUTPATIENT)
Dept: RETAIL CLINIC | Facility: CLINIC | Age: 59
End: 2019-10-23

## 2019-10-23 DIAGNOSIS — Z23 FLU VACCINE NEED: Primary | ICD-10-CM

## 2019-10-23 PROCEDURE — 90471 IMMUNIZATION ADMIN: CPT | Performed by: NURSE PRACTITIONER

## 2019-10-23 PROCEDURE — 90686 IIV4 VACC NO PRSV 0.5 ML IM: CPT | Performed by: NURSE PRACTITIONER

## 2020-01-31 ENCOUNTER — OFFICE VISIT (OUTPATIENT)
Dept: FAMILY MEDICINE CLINIC | Facility: CLINIC | Age: 60
End: 2020-01-31

## 2020-01-31 VITALS
OXYGEN SATURATION: 99 % | SYSTOLIC BLOOD PRESSURE: 138 MMHG | TEMPERATURE: 97.6 F | DIASTOLIC BLOOD PRESSURE: 72 MMHG | HEIGHT: 71 IN | HEART RATE: 60 BPM | WEIGHT: 203.1 LBS | BODY MASS INDEX: 28.43 KG/M2

## 2020-01-31 DIAGNOSIS — I10 ESSENTIAL HYPERTENSION: Primary | ICD-10-CM

## 2020-01-31 DIAGNOSIS — Z82.49 FAMILY HISTORY OF HEART DISEASE IN MALE FAMILY MEMBER BEFORE AGE 55: ICD-10-CM

## 2020-01-31 PROCEDURE — 99213 OFFICE O/P EST LOW 20 MIN: CPT | Performed by: NURSE PRACTITIONER

## 2020-02-02 ENCOUNTER — RESULTS ENCOUNTER (OUTPATIENT)
Dept: FAMILY MEDICINE CLINIC | Facility: CLINIC | Age: 60
End: 2020-02-02

## 2020-02-02 DIAGNOSIS — Z82.49 FAMILY HISTORY OF HEART DISEASE IN MALE FAMILY MEMBER BEFORE AGE 55: ICD-10-CM

## 2020-02-02 DIAGNOSIS — I10 ESSENTIAL HYPERTENSION: ICD-10-CM

## 2020-02-04 LAB
ALBUMIN SERPL-MCNC: 4.4 G/DL (ref 3.8–4.9)
ALBUMIN/GLOB SERPL: 1.5 {RATIO} (ref 1.2–2.2)
ALP SERPL-CCNC: 61 IU/L (ref 39–117)
ALT SERPL-CCNC: 13 IU/L (ref 0–44)
AST SERPL-CCNC: 20 IU/L (ref 0–40)
BILIRUB SERPL-MCNC: 0.5 MG/DL (ref 0–1.2)
BUN SERPL-MCNC: 13 MG/DL (ref 6–24)
BUN/CREAT SERPL: 11 (ref 9–20)
CALCIUM SERPL-MCNC: 9.4 MG/DL (ref 8.7–10.2)
CHLORIDE SERPL-SCNC: 104 MMOL/L (ref 96–106)
CHOLEST SERPL-MCNC: 178 MG/DL (ref 100–199)
CHOLEST/HDLC SERPL: 3.6 RATIO (ref 0–5)
CO2 SERPL-SCNC: 24 MMOL/L (ref 20–29)
CREAT SERPL-MCNC: 1.15 MG/DL (ref 0.76–1.27)
GLOBULIN SER CALC-MCNC: 3 G/DL (ref 1.5–4.5)
GLUCOSE SERPL-MCNC: 85 MG/DL (ref 65–99)
HDLC SERPL-MCNC: 50 MG/DL
LDLC SERPL CALC-MCNC: 116 MG/DL (ref 0–99)
POTASSIUM SERPL-SCNC: 4.2 MMOL/L (ref 3.5–5.2)
PROT SERPL-MCNC: 7.4 G/DL (ref 6–8.5)
SODIUM SERPL-SCNC: 143 MMOL/L (ref 134–144)
TRIGL SERPL-MCNC: 62 MG/DL (ref 0–149)
VLDLC SERPL CALC-MCNC: 12 MG/DL (ref 5–40)

## 2020-02-04 NOTE — PROGRESS NOTES
Please call him and let him know is labs are normal with the exception of his LDL cholesterol which is elevated. Exercise will help improve it.

## 2020-07-17 DIAGNOSIS — I10 ESSENTIAL HYPERTENSION: Primary | ICD-10-CM

## 2020-07-17 DIAGNOSIS — E78.2 MIXED HYPERLIPIDEMIA: ICD-10-CM

## 2020-07-17 DIAGNOSIS — N40.0 BENIGN PROSTATIC HYPERPLASIA WITHOUT LOWER URINARY TRACT SYMPTOMS: ICD-10-CM

## 2020-07-28 LAB
ALBUMIN SERPL-MCNC: 4 G/DL (ref 3.8–4.9)
ALBUMIN/GLOB SERPL: 1.4 {RATIO} (ref 1.2–2.2)
ALP SERPL-CCNC: 48 IU/L (ref 39–117)
ALT SERPL-CCNC: 11 IU/L (ref 0–44)
AST SERPL-CCNC: 16 IU/L (ref 0–40)
BILIRUB SERPL-MCNC: 0.4 MG/DL (ref 0–1.2)
BUN SERPL-MCNC: 12 MG/DL (ref 6–24)
BUN/CREAT SERPL: 10 (ref 9–20)
CALCIUM SERPL-MCNC: 9.1 MG/DL (ref 8.7–10.2)
CHLORIDE SERPL-SCNC: 106 MMOL/L (ref 96–106)
CHOLEST SERPL-MCNC: 174 MG/DL (ref 100–199)
CHOLEST/HDLC SERPL: 3.1 RATIO (ref 0–5)
CO2 SERPL-SCNC: 27 MMOL/L (ref 20–29)
CREAT SERPL-MCNC: 1.15 MG/DL (ref 0.76–1.27)
GLOBULIN SER CALC-MCNC: 2.8 G/DL (ref 1.5–4.5)
GLUCOSE SERPL-MCNC: 97 MG/DL (ref 65–99)
HDLC SERPL-MCNC: 57 MG/DL
LDLC SERPL CALC-MCNC: 103 MG/DL (ref 0–99)
POTASSIUM SERPL-SCNC: 4.1 MMOL/L (ref 3.5–5.2)
PROT SERPL-MCNC: 6.8 G/DL (ref 6–8.5)
PSA SERPL-MCNC: 5.4 NG/ML (ref 0–4)
SODIUM SERPL-SCNC: 143 MMOL/L (ref 134–144)
TRIGL SERPL-MCNC: 72 MG/DL (ref 0–149)
VLDLC SERPL CALC-MCNC: 14 MG/DL (ref 5–40)

## 2020-08-03 ENCOUNTER — OFFICE VISIT (OUTPATIENT)
Dept: FAMILY MEDICINE CLINIC | Facility: CLINIC | Age: 60
End: 2020-08-03

## 2020-08-03 VITALS
DIASTOLIC BLOOD PRESSURE: 88 MMHG | BODY MASS INDEX: 29.4 KG/M2 | HEIGHT: 71 IN | WEIGHT: 210 LBS | HEART RATE: 52 BPM | TEMPERATURE: 97.3 F | SYSTOLIC BLOOD PRESSURE: 134 MMHG | OXYGEN SATURATION: 98 %

## 2020-08-03 DIAGNOSIS — R97.20 ELEVATED PSA: ICD-10-CM

## 2020-08-03 DIAGNOSIS — L98.9 SKIN LESION OF SCALP: ICD-10-CM

## 2020-08-03 DIAGNOSIS — Z00.00 ANNUAL PHYSICAL EXAM: Primary | ICD-10-CM

## 2020-08-03 PROCEDURE — 99396 PREV VISIT EST AGE 40-64: CPT | Performed by: NURSE PRACTITIONER

## 2020-08-03 NOTE — PROGRESS NOTES
"Subjective   Junior Syed is a 59 y.o. male.     Chief Complaint   Patient presents with   • Annual Exam     Mr. Syed presents today for an annual physical exam with lab review. He states he is doing well. He is concerned about a \"lump\" on his scalp that has been there for awhile. He has no other concerns today.      I have reviewed the patient's medical history in detail and updated the computerized patient record.    The following portions of the patient's history were reviewed and updated as appropriate: allergies, current medications, past family history, past medical history, past social history, past surgical history and problem list.      Current Outpatient Medications:   •  cholecalciferol (VITAMIN D3) 1000 UNITS tablet, Take 1,000 Units by mouth Daily., Disp: , Rfl:   •  Fexofenadine HCl (ALLEGRA PO), Take 1 tablet by mouth Daily., Disp: , Rfl:   •  ibuprofen (ADVIL,MOTRIN) 200 MG tablet, Take 200 mg by mouth Every 6 (Six) Hours As Needed for mild pain (1-3)., Disp: , Rfl:      Review of Systems   Constitutional: Negative.    Eyes: Negative.    Respiratory: Negative.    Cardiovascular: Negative.    Gastrointestinal: Negative.    Genitourinary: Negative.    Musculoskeletal: Positive for arthralgias (left wrist ).   Skin:        \"Lump on top of his head\"   Neurological: Negative.    Psychiatric/Behavioral: Negative.    All other systems reviewed and are negative.      Objective    Vitals:    08/03/20 0934   BP: 134/88   Pulse: 52   Temp: 97.3 °F (36.3 °C)   SpO2: 98%     Body mass index is 29.29 kg/m².    Physical Exam   Constitutional: He is oriented to person, place, and time. He appears well-developed and well-nourished.   HENT:   Right Ear: Tympanic membrane normal.   Left Ear: Tympanic membrane normal.   Neck: Normal range of motion. Neck supple. No thyromegaly present.   Cardiovascular: Normal rate, regular rhythm, normal heart sounds and intact distal pulses.   Pulmonary/Chest: Effort normal and " breath sounds normal.   Musculoskeletal: Normal range of motion. He exhibits no edema.   Lymphadenopathy:     He has no cervical adenopathy.   Neurological: He is alert and oriented to person, place, and time.   Skin: Skin is warm and dry.   Has nevus on the top of his scalp that is dark in color and dry and flakey   Psychiatric:   No acute distress   Vitals reviewed.        Assessment/Plan   Junior was seen today for annual exam.    Diagnoses and all orders for this visit:    Annual physical exam      Mr. Syed's physical assessment is within normal parameters for him today.  He does have a concerning looking nevus on the top of his scalp. It is dark in color and crusty looking. I am referring him to dermatology for further evaluation.  I have reviewed his labs with him today. All of his labs are essentially normal.   His PSA is elevated and I am referring him to urology for further evaluation.   We discussed that he needs to increase his daily exercise and eat a lower carbohydrate, healthy fats and proteins to help keep his weight and lipid levels controled.   Follow up in 1 year for his next annual physical exam and as needed. He will need fasting labs the week before his next visit.

## 2021-03-29 ENCOUNTER — OFFICE VISIT (OUTPATIENT)
Dept: FAMILY MEDICINE CLINIC | Facility: CLINIC | Age: 61
End: 2021-03-29

## 2021-03-29 VITALS
OXYGEN SATURATION: 95 % | HEART RATE: 69 BPM | WEIGHT: 218 LBS | HEIGHT: 71 IN | SYSTOLIC BLOOD PRESSURE: 136 MMHG | BODY MASS INDEX: 30.52 KG/M2 | TEMPERATURE: 98.2 F | DIASTOLIC BLOOD PRESSURE: 92 MMHG

## 2021-03-29 DIAGNOSIS — M79.644 THUMB PAIN, RIGHT: Primary | ICD-10-CM

## 2021-03-29 PROCEDURE — 99213 OFFICE O/P EST LOW 20 MIN: CPT | Performed by: NURSE PRACTITIONER

## 2021-03-29 RX ORDER — MELOXICAM 7.5 MG/1
7.5 TABLET ORAL DAILY
Qty: 90 TABLET | Refills: 3 | Status: SHIPPED | OUTPATIENT
Start: 2021-03-29 | End: 2022-04-08

## 2021-03-29 NOTE — PROGRESS NOTES
"Chief Complaint  Pain (RT thumb, pain, swelling, and popping)    Subjective          Junior Syed presents to [facility]  Pain  This is a new (right thumb) problem. The current episode started 1 to 4 weeks ago (started 2 weeks ago). The problem occurs daily. Progression since onset: improves as the day goes on but still pops. Pertinent negatives include no numbness. Nothing aggravates the symptoms. He has tried acetaminophen for the symptoms.     I have reviewed the patient's medical history in detail and updated the computerized patient record.    Current Outpatient Medications:   •  cholecalciferol (VITAMIN D3) 1000 UNITS tablet, Take 1,000 Units by mouth Daily., Disp: , Rfl:   •  Fexofenadine HCl (ALLEGRA PO), Take 1 tablet by mouth Daily., Disp: , Rfl:   •  ibuprofen (ADVIL,MOTRIN) 200 MG tablet, Take 200 mg by mouth Every 6 (Six) Hours As Needed for mild pain (1-3)., Disp: , Rfl:   •  meloxicam (Mobic) 7.5 MG tablet, Take 1 tablet by mouth Daily., Disp: 90 tablet, Rfl: 3     Objective   Vital Signs:   /92 (BP Location: Right arm, Patient Position: Sitting, Cuff Size: Adult)   Pulse 69   Temp 98.2 °F (36.8 °C) (Infrared)   Ht 180.3 cm (71\")   Wt 98.9 kg (218 lb)   SpO2 95%   BMI 30.40 kg/m²     Physical Exam  Vitals reviewed.   Constitutional:       Appearance: Normal appearance.   Skin:     General: Skin is warm and dry.   Neurological:      Mental Status: He is alert and oriented to person, place, and time.   Psychiatric:      Comments: No acute distress        Result Review :                 Assessment and Plan    Diagnoses and all orders for this visit:    1. Thumb pain, right (Primary)  -     meloxicam (Mobic) 7.5 MG tablet; Take 1 tablet by mouth Daily.  Dispense: 90 tablet; Refill: 3    Mr. Syed presents today with complaints of pain, stiffness and popping of his right thumb. He reports that the pain/stiffness improves as the day goes on. He reports he has a " family history of osteoarthritis. He is to start Meloxicam 7.5 mg daily for his arthritic pain.       Follow Up   Return if symptoms worsen or fail to improve, for Next scheduled follow up.  Patient was given instructions and counseling regarding his condition or for health maintenance advice. Please see specific information pulled into the AVS if appropriate.

## 2021-07-20 DIAGNOSIS — Z11.59 ENCOUNTER FOR HEPATITIS C SCREENING TEST FOR LOW RISK PATIENT: ICD-10-CM

## 2021-07-20 DIAGNOSIS — Z00.00 ANNUAL PHYSICAL EXAM: Primary | ICD-10-CM

## 2021-08-17 ENCOUNTER — OFFICE VISIT (OUTPATIENT)
Dept: FAMILY MEDICINE CLINIC | Facility: CLINIC | Age: 61
End: 2021-08-17

## 2021-08-17 VITALS
WEIGHT: 223 LBS | OXYGEN SATURATION: 97 % | BODY MASS INDEX: 31.22 KG/M2 | SYSTOLIC BLOOD PRESSURE: 118 MMHG | HEIGHT: 71 IN | DIASTOLIC BLOOD PRESSURE: 78 MMHG | HEART RATE: 75 BPM

## 2021-08-17 DIAGNOSIS — Z00.00 ANNUAL PHYSICAL EXAM: Primary | ICD-10-CM

## 2021-08-17 PROCEDURE — 99396 PREV VISIT EST AGE 40-64: CPT | Performed by: NURSE PRACTITIONER

## 2021-08-17 NOTE — PROGRESS NOTES
"Chief Complaint  Annual Exam    Subjective          Junior Syed presents to [facility]  Mr. Syed presents today for an annual physical exam.    I have reviewed the patient's medical history in detail and updated the computerized patient record.    Current Outpatient Medications:   •  cholecalciferol (VITAMIN D3) 1000 UNITS tablet, Take 1,000 Units by mouth Daily., Disp: , Rfl:   •  Fexofenadine HCl (ALLEGRA PO), Take 1 tablet by mouth Daily., Disp: , Rfl:   •  ibuprofen (ADVIL,MOTRIN) 200 MG tablet, Take 200 mg by mouth Every 6 (Six) Hours As Needed for mild pain (1-3)., Disp: , Rfl:   •  meloxicam (Mobic) 7.5 MG tablet, Take 1 tablet by mouth Daily., Disp: 90 tablet, Rfl: 3     Review of Systems   Constitutional: Negative.    HENT: Negative.    Eyes: Negative.    Respiratory: Negative.    Cardiovascular: Negative.    Gastrointestinal: Negative.    Genitourinary: Negative.    Musculoskeletal: Negative.    Skin: Negative.    Neurological: Negative.    Psychiatric/Behavioral: Negative.        Objective   Vital Signs:   /78 (BP Location: Right arm, Patient Position: Sitting, Cuff Size: Adult)   Pulse 75   Ht 180.3 cm (71\")   Wt 101 kg (223 lb)   SpO2 97%   BMI 31.10 kg/m²     Physical Exam  Vitals reviewed.   Constitutional:       Appearance: Normal appearance.   HENT:      Right Ear: Tympanic membrane normal.      Left Ear: Tympanic membrane normal.   Neck:      Vascular: No carotid bruit.   Cardiovascular:      Rate and Rhythm: Normal rate and regular rhythm.      Pulses: Normal pulses.      Heart sounds: Normal heart sounds.   Pulmonary:      Effort: Pulmonary effort is normal.      Breath sounds: Normal breath sounds.   Abdominal:      General: Bowel sounds are normal.      Palpations: Abdomen is soft.   Musculoskeletal:      Cervical back: Normal range of motion and neck supple.   Lymphadenopathy:      Cervical: No cervical adenopathy.   Skin:     General: Skin is warm " and dry.   Neurological:      Mental Status: He is alert and oriented to person, place, and time.   Psychiatric:      Comments: No acute distress        Result Review :                 Assessment and Plan    Diagnoses and all orders for this visit:    1. Annual physical exam (Primary)    Mr. Syed appears to be doing well today.  His physical exam is unremarkable today.  He had his labs drawn today in the office prior to this visit.   He is to use diet and to help control his weight.     Follow Up   Return in about 1 year (around 8/17/2022), or if symptoms worsen or fail to improve, for Annual physical, Fasting labs 1 week prior to next f/u.  Patient was given instructions and counseling regarding his condition or for health maintenance advice. Please see specific information pulled into the AVS if appropriate.

## 2022-01-12 ENCOUNTER — OFFICE VISIT (OUTPATIENT)
Dept: FAMILY MEDICINE CLINIC | Facility: CLINIC | Age: 62
End: 2022-01-12

## 2022-01-12 VITALS
SYSTOLIC BLOOD PRESSURE: 148 MMHG | DIASTOLIC BLOOD PRESSURE: 86 MMHG | BODY MASS INDEX: 34.72 KG/M2 | HEART RATE: 88 BPM | WEIGHT: 248 LBS | HEIGHT: 71 IN | OXYGEN SATURATION: 97 % | TEMPERATURE: 98.7 F

## 2022-01-12 DIAGNOSIS — J06.9 ACUTE URI: Primary | ICD-10-CM

## 2022-01-12 PROCEDURE — 99213 OFFICE O/P EST LOW 20 MIN: CPT | Performed by: NURSE PRACTITIONER

## 2022-01-12 RX ORDER — CETIRIZINE HYDROCHLORIDE 5 MG/1
5 TABLET ORAL DAILY
COMMUNITY

## 2022-01-12 NOTE — PROGRESS NOTES
Chief Complaint  Nasal Congestion (started last night), Generalized Body Aches, Chills, Sinus Problem, Sore Throat, Cough, Headache, Fatigue, and Dizziness    Subjective          Junior Syed presents to [facility]  Chills  This is a new problem. The current episode started today (this morning early am). The problem occurs constantly. The problem has been unchanged. Nothing aggravates the symptoms. He has tried NSAIDs (theraflu,cough drops) for the symptoms.   Sore Throat   This is a new problem. The current episode started today. Associated symptoms include ear pain and a plugged ear sensation. Pertinent negatives include no ear discharge, hoarse voice, shortness of breath or trouble swallowing.   Cough  This is a new problem. The current episode started today. The cough is non-productive. Associated symptoms include ear congestion, ear pain, nasal congestion, postnasal drip and rhinorrhea. Pertinent negatives include no shortness of breath. Nothing aggravates the symptoms.   Headache   Associated symptoms include ear pain and rhinorrhea.   Fatigue  This is a new problem. The current episode started today. The problem occurs constantly. Nothing aggravates the symptoms.     I have reviewed the patient's medical history in detail and updated the computerized patient record.    Current Outpatient Medications:   •  cetirizine (zyrTEC) 5 MG tablet, Take 5 mg by mouth Daily., Disp: , Rfl:   •  cholecalciferol (VITAMIN D3) 1000 UNITS tablet, Take 1,000 Units by mouth Daily., Disp: , Rfl:   •  Fexofenadine HCl (ALLEGRA PO), Take 1 tablet by mouth Daily., Disp: , Rfl:   •  ibuprofen (ADVIL,MOTRIN) 200 MG tablet, Take 200 mg by mouth Every 6 (Six) Hours As Needed for mild pain (1-3)., Disp: , Rfl:   •  meloxicam (Mobic) 7.5 MG tablet, Take 1 tablet by mouth Daily., Disp: 90 tablet, Rfl: 3  Objective   Vital Signs:   /86 (BP Location: Right arm, Patient Position: Sitting, Cuff Size:  "Adult)   Pulse 88   Temp 98.7 °F (37.1 °C) (Infrared)   Ht 180.3 cm (71\")   Wt 112 kg (248 lb)   SpO2 97%   BMI 34.59 kg/m²     Physical Exam  Vitals reviewed.   Constitutional:       Appearance: He is obese. He is ill-appearing.   HENT:      Right Ear: Tympanic membrane normal.      Left Ear: Tympanic membrane normal.      Nose: Congestion and rhinorrhea present.   Cardiovascular:      Rate and Rhythm: Normal rate and regular rhythm.      Pulses: Normal pulses.      Heart sounds: Normal heart sounds.   Pulmonary:      Effort: Pulmonary effort is normal.      Breath sounds: Normal breath sounds.   Skin:     General: Skin is warm and dry.   Neurological:      Mental Status: He is alert and oriented to person, place, and time.   Psychiatric:         Mood and Affect: Mood normal.         Behavior: Behavior normal.         Thought Content: Thought content normal.         Judgment: Judgment normal.        Result Review :                 Assessment and Plan    Diagnoses and all orders for this visit:    1. Acute URI (Primary)      You have been diagnosed with an upper respiratory infection which is likely viral.  Viruses are not killed by antibiotics and therefore an antibiotic is not prescribed for you today.  Viral illness can last between 3 in 14 days, and symptoms may persist the entire course of the illness.  Symptomatic treatment is best.  You may take Mucinex D for relieving congestion and cough.  If you have high blood pressure, do not take Mucinex D, instead opting for plain Mucinex and Coricidin HBP.  Take Ibuprofen or Tylenol as needed for pain or fever.  Oral antihistamine, such as Zyrtec or Claritin may help reduce ear pressure and relieve some nasal symptoms.  A saline nasal spray may be used to keep nose clear from discharge.  Be sure that you are increasing your intake of clear to decaffeinated fluids and get plenty of rest.  If your symptoms worsen or persist follow up as needed.    Discussed he " needs to wait until Saturday/Sunday to get tested for COVID since he is fully vaccinated and his symptoms just started today.     Follow Up   Return if symptoms worsen or fail to improve, for Next scheduled follow up.  Patient was given instructions and counseling regarding his condition or for health maintenance advice. Please see specific information pulled into the AVS if appropriate.

## 2022-01-13 ENCOUNTER — TELEPHONE (OUTPATIENT)
Dept: FAMILY MEDICINE CLINIC | Facility: CLINIC | Age: 62
End: 2022-01-13

## 2022-01-13 NOTE — TELEPHONE ENCOUNTER
Caller: Junior Syed    Relationship: Self    Best call back number: [phone]     What form or medical record are you requesting: EXCUSE FROM WORK     Who is requesting this form or medical record from you: PATIENT     How would you like to receive the form or medical records (pick-up, mail, fax): SEND VIA MY CHART       Timeframe paperwork needed: ASAP     Additional notes: N/A

## 2022-04-08 DIAGNOSIS — M79.644 THUMB PAIN, RIGHT: ICD-10-CM

## 2022-04-08 RX ORDER — MELOXICAM 7.5 MG/1
TABLET ORAL
Qty: 90 TABLET | Refills: 3 | Status: SHIPPED | OUTPATIENT
Start: 2022-04-08 | End: 2022-11-07 | Stop reason: DRUGHIGH

## 2022-08-02 DIAGNOSIS — Z00.00 ANNUAL PHYSICAL EXAM: ICD-10-CM

## 2022-08-02 DIAGNOSIS — R97.20 ELEVATED PSA: Primary | ICD-10-CM

## 2022-08-19 ENCOUNTER — OFFICE VISIT (OUTPATIENT)
Dept: FAMILY MEDICINE CLINIC | Facility: CLINIC | Age: 62
End: 2022-08-19

## 2022-08-19 VITALS
HEART RATE: 70 BPM | DIASTOLIC BLOOD PRESSURE: 98 MMHG | OXYGEN SATURATION: 97 % | HEIGHT: 71 IN | BODY MASS INDEX: 35.84 KG/M2 | WEIGHT: 256 LBS | SYSTOLIC BLOOD PRESSURE: 150 MMHG

## 2022-08-19 DIAGNOSIS — I10 PRIMARY HYPERTENSION: ICD-10-CM

## 2022-08-19 DIAGNOSIS — Z00.00 ANNUAL PHYSICAL EXAM: Primary | ICD-10-CM

## 2022-08-19 PROCEDURE — 99396 PREV VISIT EST AGE 40-64: CPT | Performed by: NURSE PRACTITIONER

## 2022-08-19 RX ORDER — LISINOPRIL 20 MG/1
20 TABLET ORAL DAILY
Qty: 90 TABLET | Refills: 3 | Status: SHIPPED | OUTPATIENT
Start: 2022-08-19

## 2022-08-19 NOTE — PROGRESS NOTES
"Chief Complaint  Annual Exam (Review Labs.. )    Subjective        Junior Syed presents to [facility]  Mr. Syed presents today for an annual physical exam with lab review.     I have reviewed the patient's medical history in detail and updated the computerized patient record.      Current Outpatient Medications:   •  cetirizine (zyrTEC) 5 MG tablet, Take 5 mg by mouth Daily., Disp: , Rfl:   •  cholecalciferol (VITAMIN D3) 1000 UNITS tablet, Take 1,000 Units by mouth Daily., Disp: , Rfl:   •  Fexofenadine HCl (ALLEGRA PO), Take 1 tablet by mouth Daily., Disp: , Rfl:   •  ibuprofen (ADVIL,MOTRIN) 200 MG tablet, Take 200 mg by mouth Every 6 (Six) Hours As Needed for mild pain (1-3)., Disp: , Rfl:   •  meloxicam (MOBIC) 7.5 MG tablet, TAKE 1 TABLET BY MOUTH EVERY DAY, Disp: 90 tablet, Rfl: 3  •  lisinopril (PRINIVIL,ZESTRIL) 20 MG tablet, Take 1 tablet by mouth Daily., Disp: 90 tablet, Rfl: 3     Review of Systems   Constitutional: Negative.    HENT: Negative.    Eyes: Negative.    Respiratory: Negative.    Cardiovascular: Negative.    Gastrointestinal: Negative.    Genitourinary: Negative.    Musculoskeletal: Negative.    Skin: Negative.    Neurological: Negative.    Psychiatric/Behavioral: Negative.      Objective   Vital Signs:  /98 (BP Location: Left arm, Patient Position: Sitting, Cuff Size: Adult)   Pulse 70   Ht 180.3 cm (71\")   Wt 116 kg (256 lb)   SpO2 97%   BMI 35.70 kg/m²   Estimated body mass index is 35.7 kg/m² as calculated from the following:    Height as of this encounter: 180.3 cm (71\").    Weight as of this encounter: 116 kg (256 lb).          Physical Exam  Vitals reviewed.   Constitutional:       Appearance: Normal appearance. He is obese.   HENT:      Right Ear: Tympanic membrane normal.      Left Ear: Tympanic membrane normal.   Neck:      Vascular: No carotid bruit.   Cardiovascular:      Rate and Rhythm: Normal rate and regular rhythm.      Pulses: " Normal pulses.      Heart sounds: Normal heart sounds.   Pulmonary:      Effort: Pulmonary effort is normal.      Breath sounds: Normal breath sounds.   Abdominal:      General: Bowel sounds are normal.      Palpations: Abdomen is soft.   Musculoskeletal:         General: Normal range of motion.      Cervical back: Normal range of motion and neck supple. No tenderness.   Lymphadenopathy:      Cervical: No cervical adenopathy.   Skin:     General: Skin is warm and dry.   Neurological:      Mental Status: He is alert and oriented to person, place, and time.   Psychiatric:      Comments: No acute distress        Result Review :    Common labs    Common Labsle 8/12/22 8/12/22 8/12/22    1007 1007 1007   Glucose 94     BUN 16     Creatinine 1.14     Sodium 137     Potassium 4.2     Chloride 100     Calcium 9.4     Total Protein 7.3     Albumin 4.6     Total Bilirubin 0.6     Alkaline Phosphatase 65     AST (SGOT) 18     ALT (SGPT) 15     Total Cholesterol  196    Triglycerides  92    HDL Cholesterol  45    LDL Cholesterol   134 (A)    PSA   5.8 (A)   (A) Abnormal value       Comments are available for some flowsheets but are not being displayed.                     Assessment and Plan   Diagnoses and all orders for this visit:    1. Annual physical exam (Primary)    2. Primary hypertension  -     lisinopril (PRINIVIL,ZESTRIL) 20 MG tablet; Take 1 tablet by mouth Daily.  Dispense: 90 tablet; Refill: 3    Mr. Syed appears to be doing well today.  His physical exam is unremarkable.  I have reviewed his labs with him today.   His labs are essentially at goal.  He is to use diet and exercise to manage his weight and lipid levels.          Follow Up   Return in about 2 weeks (around 9/2/2022), or if symptoms worsen or fail to improve, for  f/u on blood pressure after starting medication.  Patient was given instructions and counseling regarding his condition or for health maintenance advice. Please see specific information  pulled into the AVS if appropriate.

## 2022-09-12 ENCOUNTER — OFFICE VISIT (OUTPATIENT)
Dept: FAMILY MEDICINE CLINIC | Facility: CLINIC | Age: 62
End: 2022-09-12

## 2022-09-12 VITALS
DIASTOLIC BLOOD PRESSURE: 88 MMHG | OXYGEN SATURATION: 98 % | SYSTOLIC BLOOD PRESSURE: 132 MMHG | HEART RATE: 74 BPM | BODY MASS INDEX: 35.42 KG/M2 | WEIGHT: 253 LBS | HEIGHT: 71 IN

## 2022-09-12 DIAGNOSIS — I10 PRIMARY HYPERTENSION: Primary | ICD-10-CM

## 2022-09-12 DIAGNOSIS — Z00.00 ANNUAL PHYSICAL EXAM: ICD-10-CM

## 2022-09-12 DIAGNOSIS — Z12.5 SCREENING FOR PROSTATE CANCER: ICD-10-CM

## 2022-09-12 DIAGNOSIS — R97.20 ELEVATED PSA: ICD-10-CM

## 2022-09-12 PROCEDURE — 99213 OFFICE O/P EST LOW 20 MIN: CPT | Performed by: NURSE PRACTITIONER

## 2022-09-12 NOTE — PROGRESS NOTES
"Chief Complaint  Hypertension (F/u)    Subjective        Junior Syed presents to [facility]  Mr. Syed presents to follow up on his blood pressure since starting Lisinopril 20 mg daily. He denies chest pain or shortness of breath.     I have reviewed the patient's medical history in detail and updated the computerized patient record.    Current Outpatient Medications:   •  cetirizine (zyrTEC) 5 MG tablet, Take 5 mg by mouth Daily., Disp: , Rfl:   •  cholecalciferol (VITAMIN D3) 1000 UNITS tablet, Take 1,000 Units by mouth Daily., Disp: , Rfl:   •  Fexofenadine HCl (ALLEGRA PO), Take 1 tablet by mouth Daily., Disp: , Rfl:   •  ibuprofen (ADVIL,MOTRIN) 200 MG tablet, Take 200 mg by mouth Every 6 (Six) Hours As Needed for mild pain (1-3)., Disp: , Rfl:   •  lisinopril (PRINIVIL,ZESTRIL) 20 MG tablet, Take 1 tablet by mouth Daily., Disp: 90 tablet, Rfl: 3  •  meloxicam (MOBIC) 7.5 MG tablet, TAKE 1 TABLET BY MOUTH EVERY DAY, Disp: 90 tablet, Rfl: 3     Objective   Vital Signs:  /88 (BP Location: Right arm, Patient Position: Sitting, Cuff Size: Adult)   Pulse 74   Ht 180.3 cm (71\")   Wt 115 kg (253 lb)   SpO2 98%   BMI 35.29 kg/m²   Estimated body mass index is 35.29 kg/m² as calculated from the following:    Height as of this encounter: 180.3 cm (71\").    Weight as of this encounter: 115 kg (253 lb).          Physical Exam  Vitals reviewed.   Constitutional:       Appearance: Normal appearance.   Cardiovascular:      Rate and Rhythm: Normal rate and regular rhythm.      Pulses: Normal pulses.      Heart sounds: Normal heart sounds.   Pulmonary:      Effort: Pulmonary effort is normal.      Breath sounds: Normal breath sounds.   Musculoskeletal:         General: Normal range of motion.      Right lower leg: No edema.      Left lower leg: No edema.   Skin:     General: Skin is warm and dry.   Neurological:      Mental Status: He is alert and oriented to person, place, and time. "   Psychiatric:      Comments: No acute distress        Result Review :                Assessment and Plan   Diagnoses and all orders for this visit:    1. Primary hypertension (Primary)    Mr. Syed appears to be doing well today.  His blood pressure is stable at 132/88. He is to continue taking Lisinopril 20 mg daily as prescribed.   Follow up in August 2023 for his next annual physical exam.        Follow Up   Return in about 11 months (around 8/20/2023), or if symptoms worsen or fail to improve, for Annual physical, Fasting labs 1 week prior to next f/u between 10-12 am.  Patient was given instructions and counseling regarding his condition or for health maintenance advice. Please see specific information pulled into the AVS if appropriate.

## 2022-11-01 ENCOUNTER — TELEPHONE (OUTPATIENT)
Dept: FAMILY MEDICINE CLINIC | Facility: CLINIC | Age: 62
End: 2022-11-01

## 2022-11-01 NOTE — TELEPHONE ENCOUNTER
Caller: Junior Syed    Relationship: Self    Best call back number: [phone]    What is the best time to reach you: ANY    Who are you requesting to speak with (clinical staff, provider,  specific staff member): LIZA BETANCUR    What was the call regarding: PATIENT STATES HIS ARTHRITIS HAS GOTTEN WORSE IN HIS TOES  OVER THE PAST COUPLE OF WEEKS AND IS REQUESTING A CALL BACK TO DISCUSS WHETHER HE NEEDS TO BE SEEN BY LIZA BETANCUR, BE REFERRED TO AN ARTHRITIS SPECIALIST OR IF HIS ARTHRITUS MEDICATION DOSAGE NEEDS TO BE INCREASED. PLEASE CALL AND ADVISE.     Do you require a callback: YES

## 2022-11-07 ENCOUNTER — OFFICE VISIT (OUTPATIENT)
Dept: FAMILY MEDICINE CLINIC | Facility: CLINIC | Age: 62
End: 2022-11-07

## 2022-11-07 VITALS
DIASTOLIC BLOOD PRESSURE: 78 MMHG | HEART RATE: 63 BPM | BODY MASS INDEX: 36.68 KG/M2 | WEIGHT: 262 LBS | OXYGEN SATURATION: 98 % | SYSTOLIC BLOOD PRESSURE: 112 MMHG | HEIGHT: 71 IN

## 2022-11-07 DIAGNOSIS — M19.90 ARTHRITIS: Primary | ICD-10-CM

## 2022-11-07 PROCEDURE — 99214 OFFICE O/P EST MOD 30 MIN: CPT | Performed by: NURSE PRACTITIONER

## 2022-11-07 RX ORDER — MELOXICAM 15 MG/1
15 TABLET ORAL DAILY
Qty: 90 TABLET | Refills: 1 | Status: SHIPPED | OUTPATIENT
Start: 2022-11-07

## 2022-11-07 NOTE — PROGRESS NOTES
"Chief Complaint  Arthritis (RT toes, & both hands & fingers)    Subjective        Junior Syed presents to [facility]  Arthritis  Presents for follow-up visit. He complains of pain, stiffness and joint swelling. He reports no joint warmth. The symptoms have been worsening. Affected locations include the right wrist, left wrist, left foot and right foot (both hands). (None) Compliance with total regimen is %. Compliance with medications is %.     I have reviewed the patient's medical history in detail and updated the computerized patient record.    Current Outpatient Medications:   •  cetirizine (zyrTEC) 5 MG tablet, Take 5 mg by mouth Daily., Disp: , Rfl:   •  cholecalciferol (VITAMIN D3) 1000 UNITS tablet, Take 1,000 Units by mouth Daily., Disp: , Rfl:   •  Fexofenadine HCl (ALLEGRA PO), Take 1 tablet by mouth Daily., Disp: , Rfl:   •  ibuprofen (ADVIL,MOTRIN) 200 MG tablet, Take 200 mg by mouth Every 6 (Six) Hours As Needed for mild pain (1-3)., Disp: , Rfl:   •  lisinopril (PRINIVIL,ZESTRIL) 20 MG tablet, Take 1 tablet by mouth Daily., Disp: 90 tablet, Rfl: 3  •  meloxicam (Mobic) 15 MG tablet, Take 1 tablet by mouth Daily., Disp: 90 tablet, Rfl: 1     Objective   Vital Signs:  /78 (BP Location: Right arm, Patient Position: Sitting, Cuff Size: Adult)   Pulse 63   Ht 180.3 cm (71\")   Wt 119 kg (262 lb)   SpO2 98%   BMI 36.54 kg/m²   Estimated body mass index is 36.54 kg/m² as calculated from the following:    Height as of this encounter: 180.3 cm (71\").    Weight as of this encounter: 119 kg (262 lb).          Physical Exam  Vitals reviewed.   Constitutional:       Appearance: Normal appearance. He is obese.   Cardiovascular:      Rate and Rhythm: Normal rate and regular rhythm.      Pulses: Normal pulses.      Heart sounds: Normal heart sounds.   Musculoskeletal:      Comments: Right  greater than left.   Skin:     General: Skin is warm and dry. "   Neurological:      Mental Status: He is alert and oriented to person, place, and time.   Psychiatric:      Comments: No acute distress        Result Review :                Assessment and Plan   Diagnoses and all orders for this visit:    1. Arthritis (Primary)  -     meloxicam (Mobic) 15 MG tablet; Take 1 tablet by mouth Daily.  Dispense: 90 tablet; Refill: 1  -     Anticardiolipin Antibody, IgG / M, Qn  -     STEVIE Direct Reflex to 11 Biomarker  -     CK  -     C-reactive protein  -     Rheumatoid factor  -     Sedimentation rate    Mr. Syed does not appear to be in any acute distress.  I will increase the Meloxicam to 15 mg daily. If this does not help improve his arthritis symptome we will consider Celebrex.   I have also sending him to have the above noted labs drawn due to his family Hx of RA.          Follow Up   Return if symptoms worsen or fail to improve, for Next scheduled follow up.  Patient was given instructions and counseling regarding his condition or for health maintenance advice. Please see specific information pulled into the AVS if appropriate.

## 2022-11-08 LAB
ANA SER QL: NEGATIVE
CARDIOLIPIN IGG SER IA-ACNC: <9 GPL U/ML (ref 0–14)
CARDIOLIPIN IGM SER IA-ACNC: <9 MPL U/ML (ref 0–12)
CK SERPL-CCNC: 97 U/L (ref 20–200)
CRP SERPL-MCNC: <0.3 MG/DL (ref 0–0.5)
ERYTHROCYTE [SEDIMENTATION RATE] IN BLOOD BY WESTERGREN METHOD: 10 MM/HR (ref 0–20)

## 2022-11-09 ENCOUNTER — TELEPHONE (OUTPATIENT)
Dept: FAMILY MEDICINE CLINIC | Facility: CLINIC | Age: 62
End: 2022-11-09

## 2023-05-05 DIAGNOSIS — M19.90 ARTHRITIS: ICD-10-CM

## 2023-05-08 RX ORDER — MELOXICAM 15 MG/1
TABLET ORAL
Qty: 90 TABLET | Refills: 1 | Status: SHIPPED | OUTPATIENT
Start: 2023-05-08

## 2023-08-21 ENCOUNTER — OFFICE VISIT (OUTPATIENT)
Dept: FAMILY MEDICINE CLINIC | Facility: CLINIC | Age: 63
End: 2023-08-21
Payer: COMMERCIAL

## 2023-08-21 VITALS
OXYGEN SATURATION: 96 % | BODY MASS INDEX: 37.94 KG/M2 | HEIGHT: 71 IN | SYSTOLIC BLOOD PRESSURE: 144 MMHG | WEIGHT: 271 LBS | DIASTOLIC BLOOD PRESSURE: 74 MMHG | HEART RATE: 62 BPM

## 2023-08-21 DIAGNOSIS — Z00.00 ANNUAL PHYSICAL EXAM: Primary | ICD-10-CM

## 2023-08-21 PROCEDURE — 99396 PREV VISIT EST AGE 40-64: CPT | Performed by: NURSE PRACTITIONER

## 2023-08-21 NOTE — PROGRESS NOTES
"Chief Complaint  Annual Exam    Subjective        Junior Syed presents to [facility] PRIMARY [facility]ARE  History of Present Illness  Mr. Syed presents today for an annual physical exam with lab review.     I have reviewed the patient's medical history in detail and updated the computerized patient record.       Current Outpatient Medications:     cetirizine (zyrTEC) 5 MG tablet, Take 1 tablet by mouth Daily., Disp: , Rfl:     cholecalciferol (VITAMIN D3) 1000 UNITS tablet, Take 1 tablet by mouth Daily., Disp: , Rfl:     Fexofenadine HCl (ALLEGRA PO), Take 1 tablet by mouth Daily., Disp: , Rfl:     ibuprofen (ADVIL,MOTRIN) 200 MG tablet, Take 1 tablet by mouth Every 6 (Six) Hours As Needed for Mild Pain., Disp: , Rfl:     lisinopril (PRINIVIL,ZESTRIL) 20 MG tablet, Take 1 tablet by mouth Daily., Disp: 90 tablet, Rfl: 3    meloxicam (MOBIC) 15 MG tablet, TAKE 1 TABLET BY MOUTH EVERY DAY, Disp: 90 tablet, Rfl: 1    Sildenafil Citrate (VIAGRA PO), Take  by mouth., Disp: , Rfl:        Review of Systems   HENT:          Wear hearing aids now   Eyes: Negative.    Respiratory: Negative.     Cardiovascular: Negative.    Gastrointestinal: Negative.    Genitourinary: Negative.    Musculoskeletal: Negative.    Skin: Negative.    Neurological: Negative.    Psychiatric/Behavioral: Negative.      Objective   Vital Signs:  /74 (BP Location: Left arm, Patient Position: Sitting, Cuff Size: Adult)   Pulse 62   Ht 180.3 cm (71\")   Wt 123 kg (271 lb)   SpO2 96%   BMI 37.80 kg/mý   Estimated body mass index is 37.8 kg/mý as calculated from the following:    Height as of this encounter: 180.3 cm (71\").    Weight as of this encounter: 123 kg (271 lb).             Physical Exam  Vitals reviewed.   Constitutional:       Appearance: Normal appearance. He is obese.   HENT:      Mouth/Throat:      Mouth: Mucous membranes are moist.      Pharynx: Oropharynx is clear.   Neck:      Vascular: No carotid bruit. "   Cardiovascular:      Rate and Rhythm: Normal rate and regular rhythm.      Pulses: Normal pulses.      Heart sounds: Normal heart sounds.   Pulmonary:      Effort: Pulmonary effort is normal.      Breath sounds: Normal breath sounds.   Abdominal:      General: Bowel sounds are normal.      Palpations: Abdomen is soft.   Musculoskeletal:      Cervical back: Normal range of motion.   Skin:     General: Skin is warm and dry.   Neurological:      Mental Status: He is alert and oriented to person, place, and time.   Psychiatric:      Comments: No acute distress      Result Review :    Common labs          8/14/2023    10:46   Common Labs   Glucose 88    BUN 12    Creatinine 1.07    Sodium 140    Potassium 4.5    Chloride 106    Calcium 9.3    Total Protein 7.0    Albumin 4.4    Total Bilirubin 0.5    Alkaline Phosphatase 62    AST (SGOT) 17    ALT (SGPT) 15    WBC 5.7    Hemoglobin 13.7    Hematocrit 41.2    Platelets 243    Total Cholesterol 188    Triglycerides 117    HDL Cholesterol 39    LDL Cholesterol  128    PSA 6.3                   Assessment and Plan   Diagnoses and all orders for this visit:    1. Annual physical exam (Primary)    Mr. Syed appears to be doing well today.  His physical exam is unremarkable.  I have reviewed his lab with him. He needs to increase his daily exercise routine.  We have discussed age related healthy behaviors.     Class 2 Severe Obesity (BMI >=35 and <=39.9). Obesity-related health conditions include the following: osteoarthritis. Obesity is unchanged. BMI is is above average; BMI management plan is completed. We discussed portion control and increasing exercise.        Follow Up   Return in about 1 year (around 8/21/2024), or if symptoms worsen or fail to improve, for Annual physical, Fasting labs 1 week prior to next f/u.  Patient was given instructions and counseling regarding his condition or for health maintenance advice. Please see specific information pulled into the AVS if  appropriate.

## 2023-08-31 DIAGNOSIS — I10 PRIMARY HYPERTENSION: ICD-10-CM

## 2023-08-31 RX ORDER — LISINOPRIL 20 MG/1
TABLET ORAL
Qty: 90 TABLET | Refills: 3 | Status: SHIPPED | OUTPATIENT
Start: 2023-08-31

## 2023-10-11 ENCOUNTER — OFFICE VISIT (OUTPATIENT)
Dept: FAMILY MEDICINE CLINIC | Facility: CLINIC | Age: 63
End: 2023-10-11
Payer: COMMERCIAL

## 2023-10-11 VITALS
DIASTOLIC BLOOD PRESSURE: 90 MMHG | HEART RATE: 69 BPM | WEIGHT: 265.4 LBS | HEIGHT: 71 IN | SYSTOLIC BLOOD PRESSURE: 140 MMHG | BODY MASS INDEX: 37.15 KG/M2 | OXYGEN SATURATION: 97 %

## 2023-10-11 DIAGNOSIS — F41.9 ANXIETY: Primary | ICD-10-CM

## 2023-10-11 PROCEDURE — 99214 OFFICE O/P EST MOD 30 MIN: CPT | Performed by: NURSE PRACTITIONER

## 2023-10-11 RX ORDER — DULOXETIN HYDROCHLORIDE 30 MG/1
30 CAPSULE, DELAYED RELEASE ORAL DAILY
Qty: 90 CAPSULE | Refills: 1 | Status: SHIPPED | OUTPATIENT
Start: 2023-10-11

## 2023-10-11 NOTE — PROGRESS NOTES
"Chief Complaint  Anxiety    Subjective        Junior Syed presents to [facility] PRIMARY [facility]ARE  Anxiety  Presents for initial visit. Onset was 1 to 6 months ago. The problem has been rapidly worsening. Symptoms include depressed mood, excessive worry, insomnia, irritability, muscle tension, nervous/anxious behavior, palpitations, panic and restlessness. Patient reports no chest pain, decreased concentration, malaise, shortness of breath or suicidal ideas. Symptoms occur most days. The severity of symptoms is severe. The symptoms are aggravated by family issues and work stress. The quality of sleep is poor. Nighttime awakenings: several.     Risk factors include marital problems. Past treatments include nothing.   I have reviewed the patient's medical history in detail and updated the computerized patient record.       Current Outpatient Medications:     cetirizine (zyrTEC) 5 MG tablet, Take 1 tablet by mouth Daily., Disp: , Rfl:     cholecalciferol (VITAMIN D3) 1000 UNITS tablet, Take 1 tablet by mouth Daily., Disp: , Rfl:     Fexofenadine HCl (ALLEGRA PO), Take 1 tablet by mouth Daily., Disp: , Rfl:     ibuprofen (ADVIL,MOTRIN) 200 MG tablet, Take 1 tablet by mouth Every 6 (Six) Hours As Needed for Mild Pain., Disp: , Rfl:     lisinopril (PRINIVIL,ZESTRIL) 20 MG tablet, TAKE 1 TABLET BY MOUTH EVERY DAY, Disp: 90 tablet, Rfl: 3    meloxicam (MOBIC) 15 MG tablet, TAKE 1 TABLET BY MOUTH EVERY DAY, Disp: 90 tablet, Rfl: 1    Sildenafil Citrate (VIAGRA PO), Take  by mouth., Disp: , Rfl:     DULoxetine (CYMBALTA) 30 MG capsule, Take 1 capsule by mouth Daily., Disp: 90 capsule, Rfl: 1     Objective   Vital Signs:  /90 (BP Location: Left arm, Patient Position: Sitting, Cuff Size: Adult)   Pulse 69   Ht 180.3 cm (70.98\")   Wt 120 kg (265 lb 6.4 oz)   SpO2 97%   BMI 37.03 kg/mý   Estimated body mass index is 37.03 kg/mý as calculated from the following:    Height as of this encounter: 180.3 cm " "(70.98\").    Weight as of this encounter: 120 kg (265 lb 6.4 oz).       Physical Exam  Vitals reviewed.   Constitutional:       Appearance: Normal appearance. He is obese.   Cardiovascular:      Rate and Rhythm: Normal rate and regular rhythm.      Pulses: Normal pulses.      Heart sounds: Normal heart sounds.   Pulmonary:      Effort: Pulmonary effort is normal.      Breath sounds: Normal breath sounds.   Skin:     General: Skin is warm and dry.   Neurological:      Mental Status: He is alert and oriented to person, place, and time.   Psychiatric:         Attention and Perception: Attention normal.         Mood and Affect: Mood is depressed. Affect is tearful.         Speech: Speech normal.         Behavior: Behavior is cooperative.         Thought Content: Thought content normal.         Cognition and Memory: Cognition and memory normal.         Judgment: Judgment normal.        Result Review :                   Assessment and Plan   Diagnoses and all orders for this visit:    1. Anxiety (Primary)  -     DULoxetine (CYMBALTA) 30 MG capsule; Take 1 capsule by mouth Daily.  Dispense: 90 capsule; Refill: 1  -     Iron Profile  -     TSH  -     CBC (No Diff)    Mr. Syed is tearful today but he is not in any acute distress.  We discussed his anxiety/depression. He is to start Duloxetine 30 mg daily.  I have also asked him to get iron profile, TSH and CBC to rule out anemia/thyroid disorder as a contributing factor.          Follow Up   Return in about 6 weeks (around 11/22/2023), or if symptoms worsen or fail to improve, for f/u on anxiety.  Patient was given instructions and counseling regarding his condition or for health maintenance advice. Please see specific information pulled into the AVS if appropriate.         "

## 2023-10-11 NOTE — LETTER
October 11, 2023     Patient: Junior Syed   YOB: 1960   Date of Visit: 10/11/2023       To Whom It May Concern:    It is my medical opinion that Junior Syed may return to work on 10/23/2023 .           Sincerely,        LIZA Oleary    CC:   No Recipients

## 2023-10-14 LAB
ERYTHROCYTE [DISTWIDTH] IN BLOOD BY AUTOMATED COUNT: 13.3 % (ref 11.6–15.4)
HCT VFR BLD AUTO: 42.7 % (ref 37.5–51)
HGB BLD-MCNC: 14.1 G/DL (ref 13–17.7)
IRON SATN MFR SERPL: 27 % (ref 15–55)
IRON SERPL-MCNC: 84 UG/DL (ref 38–169)
MCH RBC QN AUTO: 27.1 PG (ref 26.6–33)
MCHC RBC AUTO-ENTMCNC: 33 G/DL (ref 31.5–35.7)
MCV RBC AUTO: 82 FL (ref 79–97)
PLATELET # BLD AUTO: 268 X10E3/UL (ref 150–450)
RBC # BLD AUTO: 5.21 X10E6/UL (ref 4.14–5.8)
TIBC SERPL-MCNC: 314 UG/DL (ref 250–450)
TSH SERPL DL<=0.005 MIU/L-ACNC: 1.9 UIU/ML (ref 0.45–4.5)
UIBC SERPL-MCNC: 230 UG/DL (ref 111–343)
WBC # BLD AUTO: 6.8 X10E3/UL (ref 3.4–10.8)

## 2024-01-22 DIAGNOSIS — M19.90 ARTHRITIS: ICD-10-CM

## 2024-01-22 RX ORDER — MELOXICAM 15 MG/1
TABLET ORAL
Qty: 90 TABLET | Refills: 1 | Status: SHIPPED | OUTPATIENT
Start: 2024-01-22

## 2024-05-11 DIAGNOSIS — M19.90 ARTHRITIS: ICD-10-CM

## 2024-05-13 RX ORDER — MELOXICAM 15 MG/1
TABLET ORAL
Qty: 90 TABLET | Refills: 1 | Status: SHIPPED | OUTPATIENT
Start: 2024-05-13

## 2024-08-14 DIAGNOSIS — Z13.1 SCREENING FOR DIABETES MELLITUS (DM): ICD-10-CM

## 2024-08-14 DIAGNOSIS — Z13.220 SCREENING FOR HYPERLIPIDEMIA: ICD-10-CM

## 2024-08-14 DIAGNOSIS — Z12.5 SCREENING FOR PROSTATE CANCER: ICD-10-CM

## 2024-08-14 DIAGNOSIS — Z13.0 SCREENING FOR DEFICIENCY ANEMIA: ICD-10-CM

## 2024-08-14 DIAGNOSIS — Z00.00 ANNUAL PHYSICAL EXAM: Primary | ICD-10-CM

## 2024-11-04 DIAGNOSIS — I10 PRIMARY HYPERTENSION: ICD-10-CM

## 2024-11-04 RX ORDER — LISINOPRIL 20 MG/1
TABLET ORAL
Qty: 90 TABLET | Refills: 3 | OUTPATIENT
Start: 2024-11-04

## 2024-11-06 ENCOUNTER — TELEPHONE (OUTPATIENT)
Dept: GASTROENTEROLOGY | Facility: CLINIC | Age: 64
End: 2024-11-06
Payer: COMMERCIAL

## 2024-11-06 NOTE — TELEPHONE ENCOUNTER
Caller: Junior Syed    Relationship to patient: Self    Best call back number: [phone]    Chief complaint: PT IS NEEDING TO SCHEDULE A COLONOSCOPY     Type of visit: COLONOSCOPY    Requested date: FIRST AVAILABLE          Additional notes:PT IS WANTING TO SEE IF HE'S DUE THIS YEAR AND IF SO HE'S WANING TO GO AHEAD AND START TH PROCESS TO SCHEDULE

## 2024-11-06 NOTE — TELEPHONE ENCOUNTER
2/7/17 results reviewed  Per Dr Zhang 5 year recall, was not placed in epic.  Pt is overdue for colonoscopy.  Message to Marianne to please call and do screening process

## 2024-11-19 ENCOUNTER — TELEPHONE (OUTPATIENT)
Dept: GASTROENTEROLOGY | Facility: CLINIC | Age: 64
End: 2024-11-19
Payer: COMMERCIAL

## 2024-11-19 DIAGNOSIS — Z83.719 FAMILY HISTORY OF POLYPS IN THE COLON: ICD-10-CM

## 2024-11-19 DIAGNOSIS — K63.5 POLYP OF COLON, UNSPECIFIED PART OF COLON, UNSPECIFIED TYPE: Primary | ICD-10-CM

## 2024-11-19 NOTE — TELEPHONE ENCOUNTER
LAST C/S  1/30/17   IN EPIC     PERSONAL HX OF POLYPS     FAMILY HX OF POLYPS    NO FAMILY HX OF COLON CA    NO ASA OR BLOOD THINNERS      LIST OF  MEDICATIONS  LISINOPRIL  ROSUVASTATIN  TAMSULOSIN  TYLENOL              OA QUESTIONNAIRE SCANNED IN MEDIA

## 2024-11-19 NOTE — TELEPHONE ENCOUNTER
PT SCHEDULED [facility] 12/23/2024 PT VERBALIZES UNDERSTANDING [facility] WILL CALL WITH [facility] PREP INFORMATION AND [facility] PAM[facility]LET MAILED TO ADDRESS ON FILE VERIFIED BY PT OK FOR HUB TO RELAY

## 2024-12-04 ENCOUNTER — TELEPHONE (OUTPATIENT)
Dept: GASTROENTEROLOGY | Facility: CLINIC | Age: 64
End: 2024-12-04
Payer: COMMERCIAL

## 2024-12-04 NOTE — TELEPHONE ENCOUNTER
SELINA SINGLETON IN SCHEDULING PT SCHEDULED 12/13/2024 ARRIVING AT 0830AM [facility] PREP INFORMATION UPLOADED TO PT MY CHART OK FOR HUB TO RELAY

## 2024-12-04 NOTE — TELEPHONE ENCOUNTER
Hub staff attempted to follow warm transfer process and was unsuccessful     Caller: Junior Syed    Relationship to patient: Self    Best call back number: [phone]     Patient is needing: RESCHEDULE SCOPE AT [facility], INSURANCE WONT COVER AT [facility]

## 2024-12-09 NOTE — TELEPHONE ENCOUNTER
I called pt to confirm  scope for 12/13/24.Pt has no questions. Pt is aware the arrival time is 830 am.

## 2024-12-12 ENCOUNTER — TELEPHONE (OUTPATIENT)
Dept: GASTROENTEROLOGY | Facility: CLINIC | Age: 64
End: 2024-12-12
Payer: COMMERCIAL

## 2024-12-12 NOTE — TELEPHONE ENCOUNTER
CALLED COMMUNICATED TO PT DR AUGUSTINE LEFT EARLY ILL BUT THE PROCEDURE WILL STILL BE DONE BY ONE OF [facility] OTHER PROVIDERS,PT VERBALIZED  UNDERSTANDING ,OK FOR HUB TO RELAY

## 2024-12-13 ENCOUNTER — ANESTHESIA (OUTPATIENT)
Dept: GASTROENTEROLOGY | Facility: HOSPITAL | Age: 64
End: 2024-12-13
Payer: COMMERCIAL

## 2024-12-13 ENCOUNTER — HOSPITAL ENCOUNTER (OUTPATIENT)
Facility: HOSPITAL | Age: 64
Setting detail: HOSPITAL OUTPATIENT SURGERY
Discharge: HOME | End: 2024-12-13
Attending: INTERNAL MEDICINE | Admitting: INTERNAL MEDICINE
Payer: COMMERCIAL

## 2024-12-13 ENCOUNTER — ANESTHESIA EVENT (OUTPATIENT)
Dept: GASTROENTEROLOGY | Facility: HOSPITAL | Age: 64
End: 2024-12-13
Payer: COMMERCIAL

## 2024-12-13 VITALS
RESPIRATION RATE: 20 BRPM | SYSTOLIC BLOOD PRESSURE: 114 MMHG | WEIGHT: 256 LBS | HEIGHT: 71 IN | DIASTOLIC BLOOD PRESSURE: 72 MMHG | BODY MASS INDEX: 35.84 KG/M2 | OXYGEN SATURATION: 96 % | HEART RATE: 69 BPM

## 2024-12-13 DIAGNOSIS — Z83.719 FAMILY HISTORY OF POLYPS IN THE COLON: ICD-10-CM

## 2024-12-13 DIAGNOSIS — K63.5 POLYP OF COLON, UNSPECIFIED PART OF COLON, UNSPECIFIED TYPE: ICD-10-CM

## 2024-12-13 PROCEDURE — 45385 COLONOSCOPY W/LESION REMOVAL: CPT | Performed by: INTERNAL MEDICINE

## 2024-12-13 PROCEDURE — 88305 TISSUE EXAM BY PATHOLOGIST: CPT | Performed by: INTERNAL MEDICINE

## 2024-12-13 PROCEDURE — 25010000002 PROPOFOL 200 MG/20ML EMULSION: Performed by: NURSE ANESTHETIST, CERTIFIED REGISTERED

## 2024-12-13 PROCEDURE — 25810000003 LACTATED RINGERS PER 1000 ML: Performed by: INTERNAL MEDICINE

## 2024-12-13 PROCEDURE — 25010000002 LIDOCAINE 2% SOLUTION: Performed by: NURSE ANESTHETIST, CERTIFIED REGISTERED

## 2024-12-13 RX ORDER — SODIUM CHLORIDE, SODIUM LACTATE, POTASSIUM CHLORIDE, CALCIUM CHLORIDE 600; 310; 30; 20 MG/100ML; MG/100ML; MG/100ML; MG/100ML
30 INJECTION, SOLUTION INTRAVENOUS CONTINUOUS PRN
Status: DISCONTINUED | OUTPATIENT
Start: 2024-12-13 | End: 2024-12-13 | Stop reason: HOSPADM

## 2024-12-13 RX ORDER — LIDOCAINE HYDROCHLORIDE 20 MG/ML
INJECTION, SOLUTION INFILTRATION; PERINEURAL AS NEEDED
Status: DISCONTINUED | OUTPATIENT
Start: 2024-12-13 | End: 2024-12-13 | Stop reason: SURG

## 2024-12-13 RX ORDER — ROSUVASTATIN CALCIUM 20 MG/1
20 TABLET, COATED ORAL DAILY
COMMUNITY

## 2024-12-13 RX ORDER — TAMSULOSIN HYDROCHLORIDE 0.4 MG/1
1 CAPSULE ORAL DAILY
COMMUNITY

## 2024-12-13 RX ORDER — PROPOFOL 10 MG/ML
INJECTION, EMULSION INTRAVENOUS CONTINUOUS PRN
Status: DISCONTINUED | OUTPATIENT
Start: 2024-12-13 | End: 2024-12-13 | Stop reason: SURG

## 2024-12-13 RX ADMIN — PROPOFOL 40 MG: 10 INJECTION, EMULSION INTRAVENOUS at 09:19

## 2024-12-13 RX ADMIN — LIDOCAINE HYDROCHLORIDE 60 MG: 20 INJECTION, SOLUTION INFILTRATION; PERINEURAL at 09:14

## 2024-12-13 RX ADMIN — PROPOFOL 180 MCG/KG/MIN: 10 INJECTION, EMULSION INTRAVENOUS at 09:13

## 2024-12-13 RX ADMIN — PROPOFOL 60 MG: 10 INJECTION, EMULSION INTRAVENOUS at 09:14

## 2024-12-13 RX ADMIN — SODIUM CHLORIDE, POTASSIUM CHLORIDE, SODIUM LACTATE AND CALCIUM CHLORIDE: 600; 310; 30; 20 INJECTION, SOLUTION INTRAVENOUS at 09:11

## 2024-12-13 NOTE — ANESTHESIA POSTPROCEDURE EVALUATION
Patient: Junior Syed    Procedure Summary       Date: 12/13/24 Room / Location: [facility] ENDOSCOPY 6 /  [facility]    Anesthesia Start: 0911 Anesthesia Stop: 0938    Procedure: COLONOSCOPY INTO CECUM/ TERMINAL ILEUM WITH POLYPECTOMY Diagnosis:       Polyp of colon, unspecified part of colon, unspecified type      Family history of polyps in the colon      (Polyp of colon, unspecified part of colon, unspecified type [K63.5])      (Family history of polyps in the colon [Z83.719])    Surgeons: Clifford Dugan MD Provider: Roxie Nash MD    Anesthesia Type: MAC ASA Status: 2            Anesthesia Type: MAC    Vitals  Vitals Value Taken Time   /72 12/13/24 0958   Temp     Pulse 68 12/13/24 1000   Resp 20 12/13/24 0957   SpO2 96 % 12/13/24 1000   Vitals shown include unfiled device data.        Post Anesthesia Care and Evaluation    Anesthetic complications: No anesthetic complications

## 2024-12-13 NOTE — DISCHARGE INSTRUCTIONS
For the next 24 hours patient needs to be with a responsible adult.    For 24 hours DO NOT drive, operate machinery, appliances, drink alcohol, make important decisions or sign legal documents.    Start with a light or bland diet if you are feeling sick to your stomach otherwise advance to regular diet as tolerated.    Follow recommendations on procedure report if provided by your doctor.    Call Dr Dugan for problems .    Problems may include but not limited to: large amounts of bleeding, trouble breathing, repeated vomiting, severe unrelieved pain, fever or chills.

## 2024-12-13 NOTE — ANESTHESIA PREPROCEDURE EVALUATION
Anesthesia Evaluation     no history of anesthetic complications:   NPO Solid Status: > 2 hours  NPO Liquid Status: > 8 hours           Airway   Mallampati: II  TM distance: >3 FB  Neck ROM: full  No difficulty expected  Dental    (+) upper dentures and partials    Pulmonary - normal exam   (+) a smoker Former,  Cardiovascular   Exercise tolerance: good (4-7 METS)    Rhythm: regular    (+) hypertension  (-) past MI, cardiac stents      Neuro/Psych- negative ROS  GI/Hepatic/Renal/Endo    (+) GERD well controlled, renal disease- CRI    Musculoskeletal (-) negative ROS    Abdominal    Substance History - negative use     OB/GYN          Other - negative ROS                         Anesthesia Plan    ASA 2     MAC     intravenous induction     Anesthetic plan, risks, benefits, and alternatives have been provided, discussed and informed consent has been obtained with: patient.        CODE STATUS:

## 2024-12-13 NOTE — H&P
No chief complaint on file.      HPI  Patient today for a screening colonoscopy.  He has a history of diverticulosis and colon polyps.         Problem List:    Patient Active Problem List   Diagnosis    Polyp of colon    Family history of polyps in the colon       Medical History:    Past Medical History:   Diagnosis Date    GERD (gastroesophageal reflux disease)     Hypertension         Social History:    Social History     Socioeconomic History    Marital status:    Tobacco Use    Smoking status: Former     Current packs/day: 0.00     Average packs/day: 1 pack/day for 23.0 years (23.0 ttl pk-yrs)     Types: Cigarettes     Start date:      Quit date:      Years since quittin.9     Passive exposure: Past    Smokeless tobacco: Never   Vaping Use    Vaping status: Never Used   Substance and Sexual Activity    Alcohol use: No    Drug use: No       Family History:   Family History   Problem Relation Age of Onset    Alzheimer's disease Mother     Breast cancer Mother     Thyroid cancer Mother     No Known Problems Father         unable to walk due to muscular porblems    Arthritis Sister     No Known Problems Brother        Surgical History:   Past Surgical History:   Procedure Laterality Date    COLONOSCOPY      KNEE ARTHROPLASTY, PARTIAL REPLACEMENT      ROTATOR CUFF REPAIR      WISDOM TOOTH EXTRACTION           Current Facility-Administered Medications:     lactated ringers infusion, 30 mL/hr, Intravenous, Continuous PRN, Clifford Dugan MD    Allergies:   Allergies   Allergen Reactions    Shellfish-Derived Products Swelling        The following portions of the patient's history were reviewed by me and updated as appropriate: review of systems, allergies, current medications, past family history, past medical history, past social history, past surgical history and problem list.    There were no vitals filed for this visit.    PHYSICAL EXAM:    CONSTITUTIONAL:  today's vital signs reviewed by  me  GASTROINTESTINAL: abdomen is soft nontender nondistended with normal active bowel sounds, no masses are appreciated    Assessment/ Plan  We will proceed today with colonoscopy.    Risks and benefits as well as alternatives to endoscopic evaluation were explained to the patient and they voiced understanding and wish to proceed.  These risks include but are not limited to the risk of bleeding, perforation, adverse reaction to sedation, and missed lesions.  The patient was given the opportunity to ask questions prior to the endoscopic procedure.

## 2024-12-16 LAB
CYTO UR: NORMAL
LAB AP CASE REPORT: NORMAL
PATH REPORT.FINAL DX SPEC: NORMAL
PATH REPORT.GROSS SPEC: NORMAL

## 2024-12-23 ENCOUNTER — OUTSIDE FACILITY SERVICE (OUTPATIENT)
Dept: GASTROENTEROLOGY | Facility: CLINIC | Age: 64
End: 2024-12-23
Payer: COMMERCIAL

## (undated) DEVICE — CANN O2 ETCO2 FITS ALL CONN CO2 SMPL A/ 7IN DISP LF

## (undated) DEVICE — SNAR POLYP CAPTIVATOR RND STFF 2.4 240CM 10MM 1P/U

## (undated) DEVICE — TUBING, SUCTION, 1/4" X 10', STRAIGHT: Brand: MEDLINE

## (undated) DEVICE — LN SMPL CO2 SHTRM SD STREAM W/M LUER

## (undated) DEVICE — SENSR O2 OXIMAX FNGR A/ 18IN NONSTR

## (undated) DEVICE — KT ORCA ORCAPOD DISP STRL

## (undated) DEVICE — SINGLE-USE BIOPSY FORCEPS: Brand: RADIAL JAW 4

## (undated) DEVICE — ADAPT CLN BIOGUARD AIR/H2O DISP